# Patient Record
Sex: MALE | Race: WHITE | Employment: FULL TIME | URBAN - METROPOLITAN AREA
[De-identification: names, ages, dates, MRNs, and addresses within clinical notes are randomized per-mention and may not be internally consistent; named-entity substitution may affect disease eponyms.]

---

## 2017-02-28 ENCOUNTER — TELEPHONE (OUTPATIENT)
Dept: FAMILY MEDICINE CLINIC | Age: 42
End: 2017-02-28

## 2017-03-07 DIAGNOSIS — Z00.00 HEALTH CARE MAINTENANCE: Primary | ICD-10-CM

## 2017-03-07 DIAGNOSIS — Z00.00 HEALTH CARE MAINTENANCE: ICD-10-CM

## 2017-03-07 LAB
ALBUMIN SERPL-MCNC: 4.3 G/DL (ref 3.9–4.9)
ALP BLD-CCNC: 77 U/L (ref 35–104)
ALT SERPL-CCNC: 58 U/L (ref 0–41)
ANION GAP SERPL CALCULATED.3IONS-SCNC: 15 MEQ/L (ref 7–13)
AST SERPL-CCNC: 26 U/L (ref 0–40)
BASOPHILS ABSOLUTE: 0.1 K/UL (ref 0–0.2)
BASOPHILS RELATIVE PERCENT: 0.9 %
BILIRUB SERPL-MCNC: 0.6 MG/DL (ref 0–1.2)
BUN BLDV-MCNC: 15 MG/DL (ref 6–20)
CALCIUM SERPL-MCNC: 9.2 MG/DL (ref 8.6–10.2)
CHLORIDE BLD-SCNC: 101 MEQ/L (ref 98–107)
CHOLESTEROL, TOTAL: 162 MG/DL (ref 0–199)
CO2: 25 MEQ/L (ref 22–29)
CREAT SERPL-MCNC: 0.98 MG/DL (ref 0.7–1.2)
EOSINOPHILS ABSOLUTE: 0 K/UL (ref 0–0.7)
EOSINOPHILS RELATIVE PERCENT: 0.5 %
GFR AFRICAN AMERICAN: >60
GFR NON-AFRICAN AMERICAN: >60
GLOBULIN: 2.3 G/DL (ref 2.3–3.5)
GLUCOSE BLD-MCNC: 95 MG/DL (ref 74–109)
HCT VFR BLD CALC: 46.3 % (ref 42–52)
HDLC SERPL-MCNC: 33 MG/DL (ref 40–59)
HEMOGLOBIN: 15.9 G/DL (ref 14–18)
LDL CHOLESTEROL CALCULATED: 91 MG/DL (ref 0–129)
LYMPHOCYTES ABSOLUTE: 1.6 K/UL (ref 1–4.8)
LYMPHOCYTES RELATIVE PERCENT: 26.3 %
MCH RBC QN AUTO: 31.3 PG (ref 27–31.3)
MCHC RBC AUTO-ENTMCNC: 34.2 % (ref 33–37)
MCV RBC AUTO: 91.4 FL (ref 80–100)
MONOCYTES ABSOLUTE: 0.5 K/UL (ref 0.2–0.8)
MONOCYTES RELATIVE PERCENT: 9.3 %
NEUTROPHILS ABSOLUTE: 3.7 K/UL (ref 1.4–6.5)
NEUTROPHILS RELATIVE PERCENT: 63 %
PDW BLD-RTO: 13 % (ref 11.5–14.5)
PLATELET # BLD: 179 K/UL (ref 130–400)
POTASSIUM SERPL-SCNC: 4.5 MEQ/L (ref 3.5–5.1)
RBC # BLD: 5.07 M/UL (ref 4.7–6.1)
SODIUM BLD-SCNC: 141 MEQ/L (ref 132–144)
TOTAL PROTEIN: 6.6 G/DL (ref 6.4–8.1)
TRIGL SERPL-MCNC: 190 MG/DL (ref 0–200)
WBC # BLD: 5.9 K/UL (ref 4.8–10.8)

## 2017-03-22 ENCOUNTER — OFFICE VISIT (OUTPATIENT)
Dept: FAMILY MEDICINE CLINIC | Age: 42
End: 2017-03-22

## 2017-03-22 VITALS
RESPIRATION RATE: 12 BRPM | HEART RATE: 87 BPM | BODY MASS INDEX: 38.45 KG/M2 | HEIGHT: 67 IN | WEIGHT: 245 LBS | TEMPERATURE: 98 F | DIASTOLIC BLOOD PRESSURE: 62 MMHG | SYSTOLIC BLOOD PRESSURE: 94 MMHG

## 2017-03-22 DIAGNOSIS — J32.9 SINOBRONCHITIS: Primary | ICD-10-CM

## 2017-03-22 DIAGNOSIS — E34.9 HYPOTESTOSTERONEMIA: ICD-10-CM

## 2017-03-22 DIAGNOSIS — J40 SINOBRONCHITIS: Primary | ICD-10-CM

## 2017-03-22 PROCEDURE — 99213 OFFICE O/P EST LOW 20 MIN: CPT | Performed by: FAMILY MEDICINE

## 2017-03-22 PROCEDURE — G8417 CALC BMI ABV UP PARAM F/U: HCPCS | Performed by: FAMILY MEDICINE

## 2017-03-22 PROCEDURE — 1036F TOBACCO NON-USER: CPT | Performed by: FAMILY MEDICINE

## 2017-03-22 PROCEDURE — G8484 FLU IMMUNIZE NO ADMIN: HCPCS | Performed by: FAMILY MEDICINE

## 2017-03-22 PROCEDURE — G8427 DOCREV CUR MEDS BY ELIG CLIN: HCPCS | Performed by: FAMILY MEDICINE

## 2017-03-22 RX ORDER — AZITHROMYCIN 250 MG/1
TABLET, FILM COATED ORAL
Qty: 1 PACKET | Refills: 0 | Status: SHIPPED | OUTPATIENT
Start: 2017-03-22 | End: 2017-04-01

## 2017-03-29 ENCOUNTER — TELEPHONE (OUTPATIENT)
Dept: FAMILY MEDICINE CLINIC | Age: 42
End: 2017-03-29

## 2017-03-29 DIAGNOSIS — J40 SINOBRONCHITIS: Primary | ICD-10-CM

## 2017-03-29 DIAGNOSIS — J32.9 SINOBRONCHITIS: Primary | ICD-10-CM

## 2017-03-29 RX ORDER — LEVOFLOXACIN 500 MG/1
500 TABLET, FILM COATED ORAL DAILY
Qty: 7 TABLET | Refills: 0 | Status: SHIPPED | OUTPATIENT
Start: 2017-03-29 | End: 2017-04-05

## 2017-04-02 ASSESSMENT — ENCOUNTER SYMPTOMS
COUGH: 1
CHEST TIGHTNESS: 1
SORE THROAT: 0
RHINORRHEA: 1
ABDOMINAL PAIN: 0
SHORTNESS OF BREATH: 0
WHEEZING: 0
PHOTOPHOBIA: 0
SINUS PRESSURE: 1
VOICE CHANGE: 1
TROUBLE SWALLOWING: 0

## 2017-04-06 ENCOUNTER — OFFICE VISIT (OUTPATIENT)
Dept: FAMILY MEDICINE CLINIC | Age: 42
End: 2017-04-06

## 2017-04-06 VITALS
TEMPERATURE: 98.2 F | HEIGHT: 67 IN | WEIGHT: 247 LBS | SYSTOLIC BLOOD PRESSURE: 104 MMHG | DIASTOLIC BLOOD PRESSURE: 78 MMHG | RESPIRATION RATE: 12 BRPM | HEART RATE: 101 BPM | BODY MASS INDEX: 38.77 KG/M2

## 2017-04-06 DIAGNOSIS — J20.9 BRONCHOSPASM WITH BRONCHITIS, ACUTE: Primary | ICD-10-CM

## 2017-04-06 PROCEDURE — 99213 OFFICE O/P EST LOW 20 MIN: CPT | Performed by: FAMILY MEDICINE

## 2017-04-06 PROCEDURE — G8427 DOCREV CUR MEDS BY ELIG CLIN: HCPCS | Performed by: FAMILY MEDICINE

## 2017-04-06 PROCEDURE — 1036F TOBACCO NON-USER: CPT | Performed by: FAMILY MEDICINE

## 2017-04-06 PROCEDURE — G8417 CALC BMI ABV UP PARAM F/U: HCPCS | Performed by: FAMILY MEDICINE

## 2017-04-06 RX ORDER — ALBUTEROL SULFATE 90 UG/1
2 POWDER, METERED RESPIRATORY (INHALATION) EVERY 4 HOURS PRN
Qty: 1 INHALER | Refills: 5 | Status: SHIPPED | OUTPATIENT
Start: 2017-04-06 | End: 2018-04-03 | Stop reason: ALTCHOICE

## 2017-04-06 RX ORDER — LEVOFLOXACIN 500 MG/1
500 TABLET, FILM COATED ORAL DAILY
Qty: 7 TABLET | Refills: 0 | Status: SHIPPED | OUTPATIENT
Start: 2017-04-06 | End: 2017-04-13

## 2017-04-06 RX ORDER — PREDNISONE 20 MG/1
TABLET ORAL
Qty: 6 TABLET | Refills: 0 | Status: SHIPPED | OUTPATIENT
Start: 2017-04-06 | End: 2017-10-03 | Stop reason: ALTCHOICE

## 2017-04-16 ASSESSMENT — ENCOUNTER SYMPTOMS
CONSTIPATION: 0
WHEEZING: 0
SHORTNESS OF BREATH: 0
PHOTOPHOBIA: 0
RHINORRHEA: 1
SINUS PRESSURE: 1
BLOOD IN STOOL: 0
CHEST TIGHTNESS: 1
DIARRHEA: 0
SORE THROAT: 0
ABDOMINAL PAIN: 0
COUGH: 1

## 2017-09-22 ENCOUNTER — TELEPHONE (OUTPATIENT)
Dept: FAMILY MEDICINE CLINIC | Age: 42
End: 2017-09-22

## 2017-09-22 DIAGNOSIS — E34.9 HYPOTESTOSTERONEMIA: Primary | ICD-10-CM

## 2017-09-22 DIAGNOSIS — Z00.00 WELLNESS EXAMINATION: ICD-10-CM

## 2017-09-25 DIAGNOSIS — E34.9 HYPOTESTOSTERONEMIA: ICD-10-CM

## 2017-09-25 LAB — PROSTATE SPECIFIC ANTIGEN: 0.26 NG/ML

## 2017-10-03 ENCOUNTER — OFFICE VISIT (OUTPATIENT)
Dept: FAMILY MEDICINE CLINIC | Age: 42
End: 2017-10-03

## 2017-10-03 VITALS
HEART RATE: 91 BPM | DIASTOLIC BLOOD PRESSURE: 78 MMHG | WEIGHT: 239 LBS | RESPIRATION RATE: 12 BRPM | HEIGHT: 67 IN | TEMPERATURE: 98.1 F | SYSTOLIC BLOOD PRESSURE: 104 MMHG | BODY MASS INDEX: 37.51 KG/M2

## 2017-10-03 DIAGNOSIS — E34.9 HYPOTESTOSTERONEMIA: ICD-10-CM

## 2017-10-03 DIAGNOSIS — Z23 NEED FOR INFLUENZA VACCINATION: ICD-10-CM

## 2017-10-03 DIAGNOSIS — E34.9 HYPOTESTOSTERONEMIA: Primary | ICD-10-CM

## 2017-10-03 DIAGNOSIS — N52.1 ERECTILE DYSFUNCTION DUE TO DISEASES CLASSIFIED ELSEWHERE: ICD-10-CM

## 2017-10-03 PROCEDURE — 90688 IIV4 VACCINE SPLT 0.5 ML IM: CPT | Performed by: FAMILY MEDICINE

## 2017-10-03 PROCEDURE — G8427 DOCREV CUR MEDS BY ELIG CLIN: HCPCS | Performed by: FAMILY MEDICINE

## 2017-10-03 PROCEDURE — 99213 OFFICE O/P EST LOW 20 MIN: CPT | Performed by: FAMILY MEDICINE

## 2017-10-03 PROCEDURE — 90471 IMMUNIZATION ADMIN: CPT | Performed by: FAMILY MEDICINE

## 2017-10-03 PROCEDURE — 1036F TOBACCO NON-USER: CPT | Performed by: FAMILY MEDICINE

## 2017-10-03 PROCEDURE — G8484 FLU IMMUNIZE NO ADMIN: HCPCS | Performed by: FAMILY MEDICINE

## 2017-10-03 PROCEDURE — G8417 CALC BMI ABV UP PARAM F/U: HCPCS | Performed by: FAMILY MEDICINE

## 2017-10-03 RX ORDER — SILDENAFIL 100 MG/1
100 TABLET, FILM COATED ORAL PRN
Qty: 30 TABLET | Refills: 11 | Status: SHIPPED | OUTPATIENT
Start: 2017-10-03 | End: 2018-04-03 | Stop reason: SDUPTHER

## 2017-10-03 NOTE — MR AVS SNAPSHOT
Influenza, Shon Cid, 3 Years and older, IM 10/3/2017, 9/22/2016    Tdap (Boostrix, Adacel) 2/1/2008      Preventive Care        Date Due    HIV screening is recommended for all people regardless of risk factors  aged 15-65 years at least once (lifetime) who have never been HIV tested. 2/7/1992    Tetanus Combination Vaccine (2 - Td) 2/1/2018    Cholesterol Screening 3/7/2022            MyChart Signup           [facility] allows you to send messages to your doctor, view your test results, renew your prescriptions, schedule appointments, view visit notes, and more. How Do I Sign Up? 1. In your Internet browser, go to [email or web address]  2. Click on the Sign Up Now link in the Sign In box. You will see the New Member Sign Up page. 3. Enter your [employer] Access Code exactly as it appears below. You will not need to use this code after youve completed the sign-up process. If you do not sign up before the expiration date, you must request a new code. [employer] Access Code: [email or web address]  Expires: 12/2/2017  9:23 AM    4. Enter your Social Security Number ([id number]) and Date of Birth (mm/dd/yyyy) as indicated and click Submit. You will be taken to the next sign-up page. 5. Create a Ey[facility] ID. This will be your [facility] login ID and cannot be changed, so think of one that is secure and easy to remember. 6. Create a [employer] password. You can change your password at any time. 7. Enter your Password Reset Question and Answer. This can be used at a later time if you forget your password. 8. Enter your e-mail address. You will receive e-mail notification when new information is available in [address] 9. Click Sign Up. You can now view your medical record. Additional Information  If you have questions, please contact the physician practice where you receive care. Remember, [employer] is NOT to be used for urgent needs. For medical emergencies, dial 911. For questions regarding your [employer] account call [phone]. If you have a clinical question, please call your doctor's office.

## 2017-10-04 ENCOUNTER — TELEPHONE (OUTPATIENT)
Dept: FAMILY MEDICINE CLINIC | Age: 42
End: 2017-10-04

## 2017-10-04 LAB
SEX HORMONE BINDING GLOBULIN: 18 NMOL/L (ref 11–80)
TESTOSTERONE FREE PERCENT: 2.3 % (ref 1.6–2.9)
TESTOSTERONE FREE, CALC: 39 PG/ML (ref 47–244)
TESTOSTERONE TOTAL-MALE: 169 NG/DL (ref 300–890)

## 2017-10-04 NOTE — TELEPHONE ENCOUNTER
Jim Cope gave me RX for Viagra 100mg. # 30 one tablet as needed for ED and he did ask me to handle this. He was accepted into the PAP program until 12-31-17 but has not been getting his refills every month. (I did not complete original PAP application) I explained to patient that I will place a reorder 11-4-17 and again in 12-4-17 when his enrollment expires. His shipment will arrive at our office in the next 7-10 days. Please keep a look out for it and forward packing slip to me OR given me the medication and I will process. Whatever is easier.     Thanks

## 2017-10-09 NOTE — TELEPHONE ENCOUNTER
I spoke with patient and he is aware PAP Viagra is in and will  today before 6:00PM first floor in cupboard.

## 2017-10-12 ASSESSMENT — ENCOUNTER SYMPTOMS
BLOOD IN STOOL: 0
BACK PAIN: 0
ABDOMINAL PAIN: 0
SHORTNESS OF BREATH: 0
COUGH: 0

## 2017-10-17 ENCOUNTER — TELEPHONE (OUTPATIENT)
Dept: FAMILY MEDICINE CLINIC | Age: 42
End: 2017-10-17

## 2017-10-19 DIAGNOSIS — E34.9 HYPOTESTOSTERONEMIA: ICD-10-CM

## 2017-10-25 NOTE — TELEPHONE ENCOUNTER
PA WAS APPROVED FOR 200MG TWICE A MONTH. ;Coverage Start Date:09/25/2017;Coverage End Date:10/25/2018

## 2017-11-06 ENCOUNTER — TELEPHONE (OUTPATIENT)
Dept: FAMILY MEDICINE CLINIC | Age: 42
End: 2017-11-06

## 2017-12-08 NOTE — TELEPHONE ENCOUNTER
I spoke with patient and he is aware PAP Viagra 100mg. Has arrived at the office. He is also aware that this is the last refill on current PAP application, advised him that it might not be on PAP next year because it is now generic. I will check in mid January 2018.

## 2018-02-09 ENCOUNTER — TELEPHONE (OUTPATIENT)
Dept: FAMILY MEDICINE CLINIC | Age: 43
End: 2018-02-09

## 2018-03-12 ENCOUNTER — TELEPHONE (OUTPATIENT)
Dept: FAMILY MEDICINE CLINIC | Age: 43
End: 2018-03-12

## 2018-03-12 DIAGNOSIS — N52.01 ERECTILE DYSFUNCTION DUE TO ARTERIAL INSUFFICIENCY: Primary | ICD-10-CM

## 2018-03-13 RX ORDER — SILDENAFIL 100 MG/1
100 TABLET, FILM COATED ORAL PRN
Qty: 30 TABLET | Refills: 11 | Status: SHIPPED | OUTPATIENT
Start: 2018-03-13

## 2018-03-29 ENCOUNTER — TELEPHONE (OUTPATIENT)
Dept: FAMILY MEDICINE CLINIC | Age: 43
End: 2018-03-29

## 2018-04-03 ENCOUNTER — OFFICE VISIT (OUTPATIENT)
Dept: FAMILY MEDICINE CLINIC | Age: 43
End: 2018-04-03

## 2018-04-03 VITALS
RESPIRATION RATE: 14 BRPM | BODY MASS INDEX: 38.45 KG/M2 | HEART RATE: 89 BPM | HEIGHT: 67 IN | TEMPERATURE: 97.9 F | DIASTOLIC BLOOD PRESSURE: 74 MMHG | WEIGHT: 245 LBS | SYSTOLIC BLOOD PRESSURE: 106 MMHG

## 2018-04-03 DIAGNOSIS — Z51.81 MEDICATION MONITORING ENCOUNTER: ICD-10-CM

## 2018-04-03 DIAGNOSIS — E34.9 HYPOTESTOSTERONEMIA: ICD-10-CM

## 2018-04-03 DIAGNOSIS — E34.9 HYPOTESTOSTERONEMIA: Primary | ICD-10-CM

## 2018-04-03 LAB
ALBUMIN SERPL-MCNC: 4.6 G/DL (ref 3.9–4.9)
ALP BLD-CCNC: 67 U/L (ref 35–104)
ALT SERPL-CCNC: 57 U/L (ref 0–41)
ANION GAP SERPL CALCULATED.3IONS-SCNC: 13 MEQ/L (ref 7–13)
AST SERPL-CCNC: 30 U/L (ref 0–40)
BASOPHILS ABSOLUTE: 0 K/UL (ref 0–0.2)
BASOPHILS RELATIVE PERCENT: 0.7 %
BILIRUB SERPL-MCNC: 0.8 MG/DL (ref 0–1.2)
BUN BLDV-MCNC: 14 MG/DL (ref 6–20)
CALCIUM SERPL-MCNC: 8.9 MG/DL (ref 8.6–10.2)
CHLORIDE BLD-SCNC: 100 MEQ/L (ref 98–107)
CO2: 27 MEQ/L (ref 22–29)
CREAT SERPL-MCNC: 0.96 MG/DL (ref 0.7–1.2)
EOSINOPHILS ABSOLUTE: 0 K/UL (ref 0–0.7)
EOSINOPHILS RELATIVE PERCENT: 0.6 %
GFR AFRICAN AMERICAN: >60
GFR NON-AFRICAN AMERICAN: >60
GLOBULIN: 2.1 G/DL (ref 2.3–3.5)
GLUCOSE BLD-MCNC: 80 MG/DL (ref 74–109)
HCT VFR BLD CALC: 53.4 % (ref 42–52)
HEMOGLOBIN: 18.2 G/DL (ref 14–18)
LDL CHOLESTEROL DIRECT: 97 MG/DL (ref 0–129)
LYMPHOCYTES ABSOLUTE: 1.4 K/UL (ref 1–4.8)
LYMPHOCYTES RELATIVE PERCENT: 22.3 %
MCH RBC QN AUTO: 31.8 PG (ref 27–31.3)
MCHC RBC AUTO-ENTMCNC: 34.1 % (ref 33–37)
MCV RBC AUTO: 93.4 FL (ref 80–100)
MONOCYTES ABSOLUTE: 0.5 K/UL (ref 0.2–0.8)
MONOCYTES RELATIVE PERCENT: 7.7 %
NEUTROPHILS ABSOLUTE: 4.2 K/UL (ref 1.4–6.5)
NEUTROPHILS RELATIVE PERCENT: 68.7 %
PDW BLD-RTO: 14.3 % (ref 11.5–14.5)
PLATELET # BLD: 173 K/UL (ref 130–400)
POTASSIUM SERPL-SCNC: 4.6 MEQ/L (ref 3.5–5.1)
PROSTATE SPECIFIC ANTIGEN: 0.49 NG/ML
RBC # BLD: 5.72 M/UL (ref 4.7–6.1)
SODIUM BLD-SCNC: 140 MEQ/L (ref 132–144)
TOTAL PROTEIN: 6.7 G/DL (ref 6.4–8.1)
WBC # BLD: 6.1 K/UL (ref 4.8–10.8)

## 2018-04-03 PROCEDURE — 99213 OFFICE O/P EST LOW 20 MIN: CPT | Performed by: FAMILY MEDICINE

## 2018-04-03 ASSESSMENT — PATIENT HEALTH QUESTIONNAIRE - PHQ9
1. LITTLE INTEREST OR PLEASURE IN DOING THINGS: 0
SUM OF ALL RESPONSES TO PHQ QUESTIONS 1-9: 0
2. FEELING DOWN, DEPRESSED OR HOPELESS: 0
SUM OF ALL RESPONSES TO PHQ9 QUESTIONS 1 & 2: 0

## 2018-04-05 LAB
SEX HORMONE BINDING GLOBULIN: 22 NMOL/L (ref 11–80)
TESTOSTERONE FREE PERCENT: 2.3 % (ref 1.6–2.9)
TESTOSTERONE FREE, CALC: 127 PG/ML (ref 47–244)
TESTOSTERONE TOTAL-MALE: 550 NG/DL (ref 300–890)

## 2018-04-10 ASSESSMENT — ENCOUNTER SYMPTOMS
BLOOD IN STOOL: 0
SHORTNESS OF BREATH: 0
ABDOMINAL PAIN: 0
CHEST TIGHTNESS: 0

## 2018-05-22 ENCOUNTER — TELEPHONE (OUTPATIENT)
Dept: FAMILY MEDICINE CLINIC | Age: 43
End: 2018-05-22

## 2018-06-04 ENCOUNTER — TELEPHONE (OUTPATIENT)
Dept: FAMILY MEDICINE CLINIC | Age: 43
End: 2018-06-04

## 2018-06-22 ENCOUNTER — OFFICE VISIT (OUTPATIENT)
Dept: FAMILY MEDICINE CLINIC | Age: 43
End: 2018-06-22

## 2018-06-22 DIAGNOSIS — S39.011A STRAIN OF ABDOMINAL WALL, INITIAL ENCOUNTER: ICD-10-CM

## 2018-06-22 DIAGNOSIS — M54.16 LEFT LUMBAR RADICULOPATHY: Primary | ICD-10-CM

## 2018-06-22 PROCEDURE — 99213 OFFICE O/P EST LOW 20 MIN: CPT | Performed by: FAMILY MEDICINE

## 2018-06-22 RX ORDER — PREDNISONE 20 MG/1
TABLET ORAL
Qty: 18 TABLET | Refills: 0 | Status: SHIPPED | OUTPATIENT
Start: 2018-06-22 | End: 2018-10-03 | Stop reason: ALTCHOICE

## 2018-06-22 NOTE — PROGRESS NOTES
Testosterone Cypionate 200 MG/ML KIT Inject 1 cc every 2 weeks. Lars Davila in dosing ]. 2 kit 5    sildenafil (VIAGRA) 100 MG tablet Take 1 tablet by mouth as needed for Erectile Dysfunction 30 tablet 11     No current facility-administered medications on file prior to visit. Objective    Vitals:    06/22/18 1426   BP: 100/64   Pulse: 86   Resp: 16   Temp: 98.1 °F (36.7 °C)   TempSrc: Temporal   SpO2: 98%   Weight: 245 lb (111.1 kg)   Height: 5' 7\" (1.702 m)     Physical Exam   Constitutional: He is oriented to person, place, and time and well-developed, well-nourished, and in no distress. No distress. Cardiovascular:   Pulses:       Dorsalis pedis pulses are 2+ on the right side, and 2+ on the left side. Posterior tibial pulses are 2+ on the right side, and 2+ on the left side. Abdominal: Soft. Normal appearance. He exhibits no abdominal bruit and no mass. There is no hepatosplenomegaly. There is tenderness (diagram--no hernia felt upright/supine) in the left lower quadrant. There is no rigidity, no rebound, no guarding, no CVA tenderness, no tenderness at McBurney's point and negative Partida's sign. Hernia confirmed negative in the right inguinal area and confirmed negative in the left inguinal area. Musculoskeletal: He exhibits no edema. Left hip: He exhibits normal range of motion, normal strength, no tenderness and no bony tenderness. Left knee: He exhibits normal range of motion, no swelling, no effusion, no ecchymosis, no erythema and normal meniscus. Tenderness found. Lateral joint line (1/4 tx) tenderness noted. Lumbar back: He exhibits tenderness (minor) and pain. He exhibits no spasm. Back:         Legs:  Neurological: He is alert and oriented to person, place, and time. He has intact cranial nerves. Gait normal.   Reflex Scores:       Patellar reflexes are 2+ on the right side and 2+ on the left side.        Achilles reflexes are 2+ on the right side and 2+ on the left side. Skin: No rash noted. He is not diaphoretic. Psychiatric: Mood and affect normal.      ;  Assessment & Plan    Diagnosis Orders   1. Left lumbar radiculopathy  predniSONE (DELTASONE) 20 MG tablet   2. Strain of abdominal wall, initial encounter  predniSONE (DELTASONE) 20 MG tablet   call if abd pain worse for xrays/agrees  pred taper for ? L5   ; The patient is asked to make an attempt to improve diet and exercise patterns to aid in medical management of this problem. ;Present to ER if significant worsening of abdominal pain,vomiting,diarrhea or inability to tolerate liquids. No orders of the defined types were placed in this encounter. Orders Placed This Encounter   Medications    predniSONE (DELTASONE) 20 MG tablet     Sig: One po bid for 5 days then, One po daily for 5 days then, One half po daily for 6 days     Dispense:  18 tablet     Refill:  0     There are no discontinued medications. Return in about 2 months (around 8/22/2018) for hip. Call or return to clinic prn if these symptoms worsen or fail to improve as anticipated.       Sheron Guerra, DO

## 2018-07-02 VITALS
HEIGHT: 67 IN | HEART RATE: 86 BPM | WEIGHT: 245 LBS | RESPIRATION RATE: 16 BRPM | OXYGEN SATURATION: 98 % | DIASTOLIC BLOOD PRESSURE: 64 MMHG | SYSTOLIC BLOOD PRESSURE: 100 MMHG | BODY MASS INDEX: 38.45 KG/M2 | TEMPERATURE: 98.1 F

## 2018-07-02 ASSESSMENT — ENCOUNTER SYMPTOMS
NAUSEA: 0
DIARRHEA: 0
CHOKING: 0
BACK PAIN: 1
SHORTNESS OF BREATH: 0
CONSTIPATION: 0
ABDOMINAL PAIN: 0

## 2018-07-25 ENCOUNTER — TELEPHONE (OUTPATIENT)
Dept: FAMILY MEDICINE CLINIC | Age: 43
End: 2018-07-25

## 2018-10-01 ENCOUNTER — TELEPHONE (OUTPATIENT)
Dept: FAMILY MEDICINE CLINIC | Age: 43
End: 2018-10-01

## 2018-10-01 NOTE — TELEPHONE ENCOUNTER
PAP reorderd for Viagra 100mg. 1 x 30 - should ship to our office in the next 7-10 days. Please keep a lookout for it and forward packing slip to me OR place medication on my desk and I will process.      thank you

## 2018-10-03 ENCOUNTER — OFFICE VISIT (OUTPATIENT)
Dept: FAMILY MEDICINE CLINIC | Age: 43
End: 2018-10-03
Payer: COMMERCIAL

## 2018-10-03 VITALS
TEMPERATURE: 98.2 F | HEART RATE: 82 BPM | DIASTOLIC BLOOD PRESSURE: 80 MMHG | RESPIRATION RATE: 14 BRPM | SYSTOLIC BLOOD PRESSURE: 104 MMHG | BODY MASS INDEX: 37.35 KG/M2 | HEIGHT: 67 IN | WEIGHT: 238 LBS

## 2018-10-03 DIAGNOSIS — R06.83 HABITUAL SNORING: ICD-10-CM

## 2018-10-03 DIAGNOSIS — G47.10 HYPERSOMNOLENCE: ICD-10-CM

## 2018-10-03 DIAGNOSIS — Z23 NEED FOR PROPHYLACTIC VACCINATION AGAINST DIPHTHERIA-TETANUS-PERTUSSIS (DTP): ICD-10-CM

## 2018-10-03 DIAGNOSIS — E34.9 HYPOTESTOSTERONEMIA: Primary | ICD-10-CM

## 2018-10-03 DIAGNOSIS — Z23 NEED FOR INFLUENZA VACCINATION: ICD-10-CM

## 2018-10-03 PROCEDURE — 90472 IMMUNIZATION ADMIN EACH ADD: CPT | Performed by: FAMILY MEDICINE

## 2018-10-03 PROCEDURE — 90686 IIV4 VACC NO PRSV 0.5 ML IM: CPT | Performed by: FAMILY MEDICINE

## 2018-10-03 PROCEDURE — 90471 IMMUNIZATION ADMIN: CPT | Performed by: FAMILY MEDICINE

## 2018-10-03 PROCEDURE — 99213 OFFICE O/P EST LOW 20 MIN: CPT | Performed by: FAMILY MEDICINE

## 2018-10-03 PROCEDURE — 90715 TDAP VACCINE 7 YRS/> IM: CPT | Performed by: FAMILY MEDICINE

## 2018-10-03 NOTE — PROGRESS NOTES
Subjective  Abby Mauro, 41 y.o. male presents today with:  Chief Complaint   Patient presents with    Hip Pain     6 mnth f/u     Sleep Apnea     family hx of sleep apnea and spouse has found that patient has stopped breathing while sleeping and snore loud       HPI  Patient presents today for a six month follow up for hip pain. After pred, the left ant-lat hip pain improved; occ tightness after prolonged sitting in llq-abd area; no gi-red flags  ; No cardio-pulmonary probs;compliant with diet/rxs;no new gi-gu complaints   Spouse relates freq breath holding/snoring;pt w/ freq fighting to stay awake at work and while driving;pos fam hx/BRENNON  Vaccine Information Sheet, \"Influenza - Inactivated\" OR \"Live - Intranasal\"  given to Abby Mauro. Patient responses:    Have you ever had a reaction to a flu vaccine? No  Are you able to eat eggs without adverse effects? Yes  Do you have any current illness? No  Have you ever had Guillian Waka Syndrome? No    Flu vaccine given per order. Please see immunization tab. Review of Systems   Constitutional: Positive for fatigue. HENT: Negative for congestion, tinnitus, trouble swallowing and voice change. Eyes: Negative for visual disturbance. Respiratory: Negative for shortness of breath. Cardiovascular: Negative for chest pain, palpitations and leg swelling. Gastrointestinal: Positive for abdominal pain (trace in llq). Negative for blood in stool, constipation, diarrhea, nausea and vomiting. Genitourinary: Negative for dysuria, flank pain and frequency. Musculoskeletal: Positive for myalgias. Negative for arthralgias and back pain. Neurological: Positive for headaches. Negative for seizures, speech difficulty, weakness, light-headedness and numbness. Psychiatric/Behavioral: Positive for decreased concentration.        Allergies   Allergen Reactions    Cephalexin Nausea And Vomiting       Past Medical History:   Diagnosis Date    and bowel sounds are normal. He exhibits no abdominal bruit and no mass. There is no hepatosplenomegaly. There is tenderness (tr/4 tx -nam w/ straining) in the left lower quadrant. There is no rigidity, no rebound, no guarding, no CVA tenderness, no tenderness at McBurney's point and negative Partida's sign. Musculoskeletal: He exhibits no edema. Left hip: He exhibits normal range of motion, normal strength and no tenderness. Lymphadenopathy:        Head (right side): No submandibular adenopathy present. Head (left side): No submandibular adenopathy present. Right cervical: No posterior cervical adenopathy present. Left cervical: No posterior cervical adenopathy present. Neurological: He is alert and oriented to person, place, and time. He has intact cranial nerves. He has a normal Straight Leg Raise Test. Gait normal.   Skin: He is not diaphoretic.    Psychiatric: Mood and affect normal.     ;;  Orders Only on 04/03/2018   Component Date Value Ref Range Status    WBC 04/03/2018 6.1  4.8 - 10.8 K/uL Final    RBC 04/03/2018 5.72  4.70 - 6.10 M/uL Final    Hemoglobin 04/03/2018 18.2* 14.0 - 18.0 g/dL Final    Hematocrit 04/03/2018 53.4* 42.0 - 52.0 % Final    MCV 04/03/2018 93.4  80.0 - 100.0 fL Final    MCH 04/03/2018 31.8* 27.0 - 31.3 pg Final    MCHC 04/03/2018 34.1  33.0 - 37.0 % Final    RDW 04/03/2018 14.3  11.5 - 14.5 % Final    Platelets 73/76/7277 173  130 - 400 K/uL Final    Neutrophils % 04/03/2018 68.7  % Final    Lymphocytes % 04/03/2018 22.3  % Final    Monocytes % 04/03/2018 7.7  % Final    Eosinophils % 04/03/2018 0.6  % Final    Basophils % 04/03/2018 0.7  % Final    Neutrophils # 04/03/2018 4.2  1.4 - 6.5 K/uL Final    Lymphocytes # 04/03/2018 1.4  1.0 - 4.8 K/uL Final    Monocytes # 04/03/2018 0.5  0.2 - 0.8 K/uL Final    Eosinophils # 04/03/2018 0.0  0.0 - 0.7 K/uL Final    Basophils # 04/03/2018 0.0  0.0 - 0.2 K/uL Final    Testosterone Free, Calc 04/03/2018 127  47 - 244 pg/mL Final    Comment: INTERPRETIVE INFORMATION:  Testosterone, Free  Arthur Stage IV    35 - 169 pg/mL  Arthur Stage V     41 - 239 pg/mL  The concentration of Free Testosterone is derived from a mathematical  expression based on the constant for the binding of testosterone to Sex  Hormone Binding Globulin (SHBG). Access complete set of age- and/or gender-specific reference intervals  for  this test in the [facility] Test Directory ([email or web address]).  Testosterone % Free 04/03/2018 2.3  1.6 - 2.9 % Final    Comment: Performed by [facility] , [address] [facility] [phone]  www. Nilson Armijo MD - Lab. Director      Total Testosterone 04/03/2018 550  300 - 890 ng/dL Final    Comment: REFERENCE INTERVAL: Testosterone, Adult Male  Access complete set of age- and/or gender-specific reference intervals  for  this test in the [facility] Test Directory ([email or web address]).  Sex Hormone Binding 04/03/2018 22  11 - 80 nmol/L Final    Comment: REFERENCE INTERVAL: Sex Hormone Binding Globulin  Access complete set of age- and/or gender-specific reference intervals  for  this test in the [facility] Test Directory ([email or web address]).  Sodium 04/03/2018 140  132 - 144 mEq/L Final    Potassium 04/03/2018 4.6  3.5 - 5.1 mEq/L Final    Chloride 04/03/2018 100  98 - 107 mEq/L Final    CO2 04/03/2018 27  22 - 29 mEq/L Final    Anion Gap 04/03/2018 13  7 - 13 mEq/L Final    Glucose 04/03/2018 80  74 - 109 mg/dL Final    BUN 04/03/2018 14  6 - 20 mg/dL Final    CREATININE 04/03/2018 0.96  0.70 - 1.20 mg/dL Final    GFR Non- 04/03/2018 >60.0  >60 Final    Comment: >60 mL/min/1.73m2 EGFR, calc. for ages 25 and older using the  MDRD formula (not corrected for weight), is valid for stable  renal function.       GFR  04/03/2018 >60.0  >60 Final    Comment: >60 mL/min/1.73m2 EGFR, calc. for ages 25 and older using the  MDRD [DISCONTINUED] Testosterone Cypionate 200 MG/ML KIT Inject 1 cc every 2 weeks. Lillian Hamm in dosing ].  2 kit 5     No facility-administered encounter medications on file as of 10/3/2018.       ;Controlled Med Review:    Indication-Reviewed: low testosterone and mental/physical sxs  Reviewed that condition still requires assistance with medication  Reviewed that condition impacts on psychological and/or physical function  Reviewed that medication does indeed improve function/pain  Reviewed patient/physician roles in compliance   Reviewed acceptable alternatives [CBT/meds/exercise/PT]  Reviewed Medication Agreement  Reviewed use/abuse/diversion of meds  No significant interactions/side effects reported       Sierra Silvia, DO

## 2018-10-09 ENCOUNTER — TELEPHONE (OUTPATIENT)
Dept: FAMILY MEDICINE CLINIC | Age: 43
End: 2018-10-09

## 2018-10-09 ASSESSMENT — ENCOUNTER SYMPTOMS
TROUBLE SWALLOWING: 0
VOICE CHANGE: 0
SHORTNESS OF BREATH: 0
NAUSEA: 0
DIARRHEA: 0
ABDOMINAL PAIN: 1
VOMITING: 0
CONSTIPATION: 0
BACK PAIN: 0
BLOOD IN STOOL: 0

## 2018-10-22 ENCOUNTER — HOSPITAL ENCOUNTER (OUTPATIENT)
Dept: SLEEP CENTER | Age: 43
Discharge: HOME | End: 2018-10-24
Payer: COMMERCIAL

## 2018-10-22 PROCEDURE — G0399 HOME SLEEP TEST/TYPE 3 PORTA: HCPCS

## 2018-10-30 DIAGNOSIS — G47.10 HYPERSOMNOLENCE: ICD-10-CM

## 2018-10-30 DIAGNOSIS — R06.83 HABITUAL SNORING: ICD-10-CM

## 2018-11-01 DIAGNOSIS — G47.33 OSA (OBSTRUCTIVE SLEEP APNEA): Primary | ICD-10-CM

## 2018-11-03 DIAGNOSIS — E34.9 HYPOTESTOSTERONEMIA: ICD-10-CM

## 2018-11-03 LAB
HCT VFR BLD CALC: 53 % (ref 42–52)
HEMOGLOBIN: 18.3 G/DL (ref 14–18)
MCH RBC QN AUTO: 32.4 PG (ref 27–31.3)
MCHC RBC AUTO-ENTMCNC: 34.5 % (ref 33–37)
MCV RBC AUTO: 93.8 FL (ref 80–100)
PDW BLD-RTO: 13.3 % (ref 11.5–14.5)
PLATELET # BLD: 183 K/UL (ref 130–400)
PROSTATE SPECIFIC ANTIGEN: 0.39 NG/ML
RBC # BLD: 5.65 M/UL (ref 4.7–6.1)
WBC # BLD: 6.4 K/UL (ref 4.8–10.8)

## 2018-11-06 LAB — TESTOSTERONE TOTAL-MALE: 1099 NG/DL (ref 300–890)

## 2018-11-16 ENCOUNTER — HOSPITAL ENCOUNTER (OUTPATIENT)
Dept: SLEEP CENTER | Age: 43
Discharge: HOME | End: 2018-11-18
Payer: COMMERCIAL

## 2018-11-16 PROCEDURE — 95811 POLYSOM 6/>YRS CPAP 4/> PARM: CPT

## 2018-11-16 PROCEDURE — 95811 POLYSOM 6/>YRS CPAP 4/> PARM: CPT | Performed by: INTERNAL MEDICINE

## 2018-11-19 DIAGNOSIS — G47.33 OSA (OBSTRUCTIVE SLEEP APNEA): ICD-10-CM

## 2018-11-28 DIAGNOSIS — G47.33 OSA (OBSTRUCTIVE SLEEP APNEA): Primary | ICD-10-CM

## 2018-11-29 ENCOUNTER — OFFICE VISIT (OUTPATIENT)
Dept: PULMONOLOGY | Age: 43
End: 2018-11-29
Payer: COMMERCIAL

## 2018-11-29 VITALS
TEMPERATURE: 97.5 F | DIASTOLIC BLOOD PRESSURE: 88 MMHG | SYSTOLIC BLOOD PRESSURE: 118 MMHG | BODY MASS INDEX: 36.79 KG/M2 | HEART RATE: 76 BPM | OXYGEN SATURATION: 99 % | HEIGHT: 67 IN | WEIGHT: 234.4 LBS

## 2018-11-29 DIAGNOSIS — G47.33 OBSTRUCTIVE SLEEP APNEA: Primary | ICD-10-CM

## 2018-11-29 PROCEDURE — 1036F TOBACCO NON-USER: CPT | Performed by: INTERNAL MEDICINE

## 2018-11-29 PROCEDURE — 99203 OFFICE O/P NEW LOW 30 MIN: CPT | Performed by: INTERNAL MEDICINE

## 2018-11-29 PROCEDURE — G8482 FLU IMMUNIZE ORDER/ADMIN: HCPCS | Performed by: INTERNAL MEDICINE

## 2018-11-29 PROCEDURE — G8427 DOCREV CUR MEDS BY ELIG CLIN: HCPCS | Performed by: INTERNAL MEDICINE

## 2018-11-29 PROCEDURE — G8417 CALC BMI ABV UP PARAM F/U: HCPCS | Performed by: INTERNAL MEDICINE

## 2018-11-29 ASSESSMENT — ENCOUNTER SYMPTOMS
SINUS PRESSURE: 0
ABDOMINAL PAIN: 0
NAUSEA: 0
DIARRHEA: 0
SORE THROAT: 0
SHORTNESS OF BREATH: 0
CHEST TIGHTNESS: 0
RHINORRHEA: 0
WHEEZING: 0
VOMITING: 0
COUGH: 0

## 2018-11-29 NOTE — PROGRESS NOTES
encounter. Orders Placed This Encounter   Medications    CPAP Machine MISC     Sig: by Does not apply route Start CPAP at 9 cm of water     Dispense:  1 each     Refill:  0           Return in about 3 months (around 2/28/2019) for re-evaluation.       Lashae Figueroa MD

## 2018-12-07 ENCOUNTER — TELEPHONE (OUTPATIENT)
Dept: FAMILY MEDICINE CLINIC | Age: 43
End: 2018-12-07

## 2019-02-04 ENCOUNTER — OFFICE VISIT (OUTPATIENT)
Dept: FAMILY MEDICINE CLINIC | Age: 44
End: 2019-02-04
Payer: COMMERCIAL

## 2019-02-04 VITALS
HEIGHT: 67 IN | BODY MASS INDEX: 35.79 KG/M2 | WEIGHT: 228 LBS | SYSTOLIC BLOOD PRESSURE: 102 MMHG | TEMPERATURE: 97.4 F | DIASTOLIC BLOOD PRESSURE: 68 MMHG | RESPIRATION RATE: 16 BRPM | HEART RATE: 76 BPM

## 2019-02-04 DIAGNOSIS — E34.9 HYPOTESTOSTERONEMIA: ICD-10-CM

## 2019-02-04 PROCEDURE — G8417 CALC BMI ABV UP PARAM F/U: HCPCS | Performed by: FAMILY MEDICINE

## 2019-02-04 PROCEDURE — G8427 DOCREV CUR MEDS BY ELIG CLIN: HCPCS | Performed by: FAMILY MEDICINE

## 2019-02-04 PROCEDURE — 1036F TOBACCO NON-USER: CPT | Performed by: FAMILY MEDICINE

## 2019-02-04 PROCEDURE — 99213 OFFICE O/P EST LOW 20 MIN: CPT | Performed by: FAMILY MEDICINE

## 2019-02-04 PROCEDURE — G8482 FLU IMMUNIZE ORDER/ADMIN: HCPCS | Performed by: FAMILY MEDICINE

## 2019-02-11 ASSESSMENT — ENCOUNTER SYMPTOMS
ABDOMINAL PAIN: 0
NAUSEA: 0
SHORTNESS OF BREATH: 0
BLOOD IN STOOL: 0
COUGH: 0

## 2019-02-23 ENCOUNTER — TELEPHONE (OUTPATIENT)
Dept: FAMILY MEDICINE CLINIC | Age: 44
End: 2019-02-23

## 2019-02-25 ENCOUNTER — TELEPHONE (OUTPATIENT)
Dept: ADMINISTRATIVE | Age: 44
End: 2019-02-25

## 2019-02-26 ENCOUNTER — TELEPHONE (OUTPATIENT)
Dept: FAMILY MEDICINE CLINIC | Age: 44
End: 2019-02-26

## 2019-05-06 ENCOUNTER — OFFICE VISIT (OUTPATIENT)
Dept: FAMILY MEDICINE CLINIC | Age: 44
End: 2019-05-06
Payer: COMMERCIAL

## 2019-05-06 VITALS
TEMPERATURE: 97.4 F | HEART RATE: 85 BPM | SYSTOLIC BLOOD PRESSURE: 122 MMHG | RESPIRATION RATE: 16 BRPM | OXYGEN SATURATION: 98 % | DIASTOLIC BLOOD PRESSURE: 64 MMHG | WEIGHT: 232 LBS | BODY MASS INDEX: 37.28 KG/M2 | HEIGHT: 66 IN

## 2019-05-06 DIAGNOSIS — E34.9 HYPOTESTOSTERONEMIA: ICD-10-CM

## 2019-05-06 PROCEDURE — 1036F TOBACCO NON-USER: CPT | Performed by: INTERNAL MEDICINE

## 2019-05-06 PROCEDURE — G8427 DOCREV CUR MEDS BY ELIG CLIN: HCPCS | Performed by: INTERNAL MEDICINE

## 2019-05-06 PROCEDURE — G8417 CALC BMI ABV UP PARAM F/U: HCPCS | Performed by: INTERNAL MEDICINE

## 2019-05-06 PROCEDURE — 99213 OFFICE O/P EST LOW 20 MIN: CPT | Performed by: INTERNAL MEDICINE

## 2019-05-06 ASSESSMENT — PATIENT HEALTH QUESTIONNAIRE - PHQ9
SUM OF ALL RESPONSES TO PHQ9 QUESTIONS 1 & 2: 0
SUM OF ALL RESPONSES TO PHQ QUESTIONS 1-9: 0
2. FEELING DOWN, DEPRESSED OR HOPELESS: 0
SUM OF ALL RESPONSES TO PHQ QUESTIONS 1-9: 0
1. LITTLE INTEREST OR PLEASURE IN DOING THINGS: 0

## 2019-05-06 NOTE — PROGRESS NOTES
Renzo Natarajan 45 y.o. male presents today with   Chief Complaint   Patient presents with   [facility] Establish Care     Pt. is here today to establish care, previous pcp was Dr. Deon Helton. He states he is here today for a 3 month follow up to check on Hypotestosteronemia. HPI  Check on testosterone. He feels go on it. Past Medical History:   Diagnosis Date    Hypotestosteronemia      Patient Active Problem List    Diagnosis Date Noted    BRENNON (obstructive sleep apnea)     Hypotestosteronemia      No past surgical history on file. No family history on file. Social History     Socioeconomic History    Marital status:      Spouse name: None    Number of children: None    Years of education: None    Highest education level: None   Occupational History    None   Social Needs    Financial resource strain: None    Food insecurity:     Worry: None     Inability: None    Transportation needs:     Medical: None     Non-medical: None   Tobacco Use    Smoking status: Never Smoker    Smokeless tobacco: Never Used   Substance and Sexual Activity    Alcohol use: Yes     Comment: very little    Drug use: No    Sexual activity: None   Lifestyle    Physical activity:     Days per week: None     Minutes per session: None    Stress: None   Relationships    Social connections:     Talks on phone: None     Gets together: None     Attends [employer] service: None     Active member of club or organization: None     Attends meetings of clubs or organizations: None     Relationship status: None    Intimate partner violence:     Fear of current or ex partner: None     Emotionally abused: None     Physically abused: None     Forced sexual activity: None   Other Topics Concern    None   Social History Narrative    None     Allergies   Allergen Reactions    Cephalexin Nausea And Vomiting       Review of Systems   Constitutional: Negative for fatigue and fever.    HENT: Negative for trouble swallowing and voice change. Eyes: Negative for photophobia and visual disturbance. Respiratory: Negative for choking and shortness of breath. Cardiovascular: Negative for chest pain and palpitations. Gastrointestinal: Negative for nausea and vomiting. Genitourinary: Negative for decreased urine volume, testicular pain and urgency. Skin: Negative for rash. Neurological: Negative for tremors and syncope. Hematological: Does not bruise/bleed easily. Psychiatric/Behavioral: Negative for suicidal ideas. Vitals:    05/06/19 1403   BP: 122/64   Pulse: 85   Resp: 16   Temp: 97.4 °F (36.3 °C)   TempSrc: Temporal   SpO2: 98%   Weight: 232 lb (105.2 kg)   Height: 5' 6\" (1.676 m)       Physical Exam   Constitutional: He appears well-developed and well-nourished. HENT:   Head: Normocephalic and atraumatic. Eyes: Pupils are equal, round, and reactive to light. Conjunctivae and EOM are normal.   Neck: Normal range of motion. No thyromegaly present. Cardiovascular: Normal rate and regular rhythm. Pulmonary/Chest: Effort normal. No respiratory distress. He has no wheezes. Abdominal: Soft. He exhibits no distension. Musculoskeletal: Normal range of motion. Neurological: He is alert. Skin: Skin is dry. Psychiatric: He has a normal mood and affect. I discuss the risk of testosterone  Assessment/Plan  Kelly Oden was seen today for establish care. Diagnoses and all orders for this visit:    Hypotestosteronemia  -     Testosterone Cypionate 200 MG/ML KIT; Inject 1 cc every 3 weeeks  -     Testosterone Free And Total Male; Future        No follow-ups on file.     Aubree Reeves MD

## 2019-05-08 ENCOUNTER — OFFICE VISIT (OUTPATIENT)
Dept: PULMONOLOGY | Age: 44
End: 2019-05-08
Payer: COMMERCIAL

## 2019-05-08 VITALS
HEART RATE: 81 BPM | TEMPERATURE: 98 F | HEIGHT: 67 IN | RESPIRATION RATE: 16 BRPM | OXYGEN SATURATION: 98 % | SYSTOLIC BLOOD PRESSURE: 118 MMHG | BODY MASS INDEX: 36.7 KG/M2 | DIASTOLIC BLOOD PRESSURE: 68 MMHG | WEIGHT: 233.8 LBS

## 2019-05-08 DIAGNOSIS — G47.33 OSA ON CPAP: Primary | ICD-10-CM

## 2019-05-08 DIAGNOSIS — E34.9 HYPOTESTOSTERONEMIA: ICD-10-CM

## 2019-05-08 DIAGNOSIS — Z99.89 OSA ON CPAP: Primary | ICD-10-CM

## 2019-05-08 PROCEDURE — G8427 DOCREV CUR MEDS BY ELIG CLIN: HCPCS | Performed by: INTERNAL MEDICINE

## 2019-05-08 PROCEDURE — 1036F TOBACCO NON-USER: CPT | Performed by: INTERNAL MEDICINE

## 2019-05-08 PROCEDURE — 99213 OFFICE O/P EST LOW 20 MIN: CPT | Performed by: INTERNAL MEDICINE

## 2019-05-08 PROCEDURE — G8417 CALC BMI ABV UP PARAM F/U: HCPCS | Performed by: INTERNAL MEDICINE

## 2019-05-08 ASSESSMENT — ENCOUNTER SYMPTOMS
CHEST TIGHTNESS: 0
DIARRHEA: 0
NAUSEA: 0
VOMITING: 0
COUGH: 0
RHINORRHEA: 0
SINUS PRESSURE: 0
SHORTNESS OF BREATH: 0
ABDOMINAL PAIN: 0
SORE THROAT: 0
WHEEZING: 0

## 2019-05-08 NOTE — PROGRESS NOTES
Subjective: Иван Moss is a 45 y.o. male who complains today of:     Chief Complaint   Patient presents with    Follow-up     3 Month F/U for BRENNON on CPAP       HPI  Patient is here for a follow-up visit regarding obstructive sleep apnea. Since his initial visit patient was started on CPAP therapy at 9 cm of water. He has done very well with treatment using it every night, does not sleep without it, averaging 6 hours or more of sleep. His AHI has remained well below 5 per hour. He sleeps through the night without waking up much at all. Feels rested in the morning but still having low energy level and \"sleep attacks\" occasionally. He also is having difficulty controlling his testosterone level is replacement injection was decreased by his primary physician recently at the same time he started CPAP and that seemed to worsen his daytime energy level. This is being increased gradually again    Allergies:  Cephalexin  Past Medical History:   Diagnosis Date    Hypotestosteronemia      History reviewed. No pertinent surgical history. History reviewed. No pertinent family history.   Social History     Socioeconomic History    Marital status:      Spouse name: Not on file    Number of children: Not on file    Years of education: Not on file    Highest education level: Not on file   Occupational History    Not on file   Social Needs    Financial resource strain: Not on file    Food insecurity:     Worry: Not on file     Inability: Not on file    Transportation needs:     Medical: Not on file     Non-medical: Not on file   Tobacco Use    Smoking status: Never Smoker    Smokeless tobacco: Never Used   Substance and Sexual Activity    Alcohol use: Yes     Comment: very little    Drug use: No    Sexual activity: Not on file   Lifestyle    Physical activity:     Days per week: Not on file     Minutes per session: Not on file    Stress: Not on file   Relationships    Social connections:

## 2019-05-09 ASSESSMENT — ENCOUNTER SYMPTOMS
VOICE CHANGE: 0
TROUBLE SWALLOWING: 0
CHOKING: 0
NAUSEA: 0
VOMITING: 0
SHORTNESS OF BREATH: 0
PHOTOPHOBIA: 0

## 2019-08-21 ENCOUNTER — TELEPHONE (OUTPATIENT)
Dept: FAMILY MEDICINE CLINIC | Age: 44
End: 2019-08-21

## 2019-08-21 NOTE — TELEPHONE ENCOUNTER
Received a fax from [employer] [facility]-need current financial documentation. The pay stubs that I have are from 12-30-18 and 1-27-19. I left a message for patient to contact Emily at [phone] regarding PAP     Patient did call me back and he will scan pay stubs to me. Once received I will process. Spoke with Celia at [employer] [facility] for Viagra 100mg approved 8-22-19 and [facility] to [facility] office in the next 7-10 days. Please keep a look out for it and forward packing slip to me in [facility]. I spoke with patient and [facility].

## 2019-10-23 DIAGNOSIS — E34.9 HYPOTESTOSTERONEMIA: ICD-10-CM

## 2019-10-25 LAB
SEX HORMONE BINDING GLOBULIN: 19 NMOL/L (ref 11–80)
TESTOSTERONE FREE PERCENT: 2.2 % (ref 1.6–2.9)
TESTOSTERONE FREE, CALC: 39 PG/ML (ref 47–244)
TESTOSTERONE TOTAL-MALE: 176 NG/DL (ref 300–890)

## 2019-10-29 ENCOUNTER — TELEPHONE (OUTPATIENT)
Dept: FAMILY MEDICINE CLINIC | Age: 44
End: 2019-10-29

## 2019-11-11 ENCOUNTER — OFFICE VISIT (OUTPATIENT)
Dept: PRIMARY CARE CLINIC | Age: 44
End: 2019-11-11
Payer: COMMERCIAL

## 2019-11-11 VITALS
RESPIRATION RATE: 14 BRPM | BODY MASS INDEX: 37.2 KG/M2 | TEMPERATURE: 97.5 F | HEART RATE: 80 BPM | SYSTOLIC BLOOD PRESSURE: 114 MMHG | WEIGHT: 237 LBS | OXYGEN SATURATION: 97 % | DIASTOLIC BLOOD PRESSURE: 63 MMHG | HEIGHT: 67 IN

## 2019-11-11 DIAGNOSIS — E34.9 HYPOTESTOSTERONEMIA: Primary | ICD-10-CM

## 2019-11-11 DIAGNOSIS — Z12.5 PROSTATE CANCER SCREENING: ICD-10-CM

## 2019-11-11 DIAGNOSIS — Z00.00 PREVENTATIVE HEALTH CARE: ICD-10-CM

## 2019-11-11 PROCEDURE — 99213 OFFICE O/P EST LOW 20 MIN: CPT | Performed by: INTERNAL MEDICINE

## 2019-11-11 PROCEDURE — G8417 CALC BMI ABV UP PARAM F/U: HCPCS | Performed by: INTERNAL MEDICINE

## 2019-11-11 PROCEDURE — 1036F TOBACCO NON-USER: CPT | Performed by: INTERNAL MEDICINE

## 2019-11-11 PROCEDURE — G8484 FLU IMMUNIZE NO ADMIN: HCPCS | Performed by: INTERNAL MEDICINE

## 2019-11-11 PROCEDURE — G8427 DOCREV CUR MEDS BY ELIG CLIN: HCPCS | Performed by: INTERNAL MEDICINE

## 2019-11-14 ENCOUNTER — TELEPHONE (OUTPATIENT)
Dept: FAMILY MEDICINE CLINIC | Age: 44
End: 2019-11-14

## 2019-11-17 ASSESSMENT — ENCOUNTER SYMPTOMS
VOMITING: 0
CHOKING: 0
SHORTNESS OF BREATH: 0
TROUBLE SWALLOWING: 0
PHOTOPHOBIA: 0
VOICE CHANGE: 0
NAUSEA: 0

## 2019-11-20 ENCOUNTER — TELEPHONE (OUTPATIENT)
Dept: PRIMARY CARE CLINIC | Age: 44
End: 2019-11-20

## 2019-11-23 ENCOUNTER — TELEPHONE (OUTPATIENT)
Dept: PRIMARY CARE CLINIC | Age: 44
End: 2019-11-23

## 2020-06-16 ENCOUNTER — TELEPHONE (OUTPATIENT)
Dept: PRIMARY CARE CLINIC | Age: 45
End: 2020-06-16

## 2020-06-16 RX ORDER — TESTOSTERONE CYPIONATE 200 MG/ML
INJECTION INTRAMUSCULAR
Qty: 2 ML | Refills: 0 | Status: SHIPPED | OUTPATIENT
Start: 2020-06-16 | End: 2020-08-02

## 2020-06-17 LAB
BASOPHILS # BLD: 0.04 X10E9/L (ref 0–0.1)
BASOPHILS RELATIVE PERCENT: 0.6 % (ref 0–2)
CHOLESTEROL/HDL RATIO: 4.8
CHOLESTEROL: 186 MG/DL (ref 0–199)
EOSINOPHIL # BLD: 0.04 X10E9/L (ref 0–0.7)
EOSINOPHILS RELATIVE PERCENT: 0.6 % (ref 0–6)
ERYTHROCYTE [DISTWIDTH] IN BLOOD BY AUTOMATED COUNT: 13.3 % (ref 11.5–14)
HCT VFR BLD CALC: 52.6 % (ref 41–52)
HDLC SERPL-MCNC: 38.5 MG/DL
HEMOGLOBIN: 17.4 G/DL (ref 13.5–17)
IMMATURE GRANULOCYTES %: 0.3 % (ref 0–0.9)
LDL CHOLESTEROL: 103 MG/DL (ref 0–99)
LYMPHOCYTES # BLD: 27.9 % (ref 13–44)
LYMPHOCYTES RELATIVE PERCENT: 1.73 X10E9/L (ref 1.2–4.8)
MCHC RBC AUTO-ENTMCNC: 33.1 G/DL (ref 32–36)
MCV RBC AUTO: 94 FL (ref 80–100)
MONOCYTES # BLD: 0.63 X10E9/L (ref 0.1–1)
MONOCYTES RELATIVE PERCENT: 10.2 % (ref 2–10)
NEUTROPHILS RELATIVE PERCENT: 60.4 % (ref 40–80)
NEUTROPHILS: 3.74 X10E9/L (ref 1.2–7.7)
NON-HDL CHOLESTEROL: 148 MG/DL
NUCLEATED RBCS: 0 /100 WBC (ref 0–0)
PLATELET # BLD: 175 X10E9/L (ref 150–450)
PROSTATE SPECIFIC ANTIGEN: 0.22 NG/ML (ref 0–4)
RBC # BLD: 5.62 X10E12/L (ref 4.5–5.9)
TRIGL SERPL-MCNC: 225 MG/DL (ref 0–149)
VLDLC SERPL CALC-MCNC: 45 MG/DL (ref 0–40)
WBC: 6.2 X10E9/L (ref 4.4–11.3)

## 2020-06-22 LAB
TESTOSTERONE FREE: 33.5 PG/ML (ref 35–155)
TESTOSTERONE TOTAL-MALE: 182 NG/DL (ref 250–1100)

## 2020-06-29 ENCOUNTER — OFFICE VISIT (OUTPATIENT)
Dept: PRIMARY CARE CLINIC | Age: 45
End: 2020-06-29
Payer: COMMERCIAL

## 2020-06-29 VITALS
HEIGHT: 67 IN | WEIGHT: 234 LBS | SYSTOLIC BLOOD PRESSURE: 118 MMHG | TEMPERATURE: 98.9 F | OXYGEN SATURATION: 95 % | HEART RATE: 93 BPM | RESPIRATION RATE: 14 BRPM | BODY MASS INDEX: 36.73 KG/M2 | DIASTOLIC BLOOD PRESSURE: 80 MMHG

## 2020-06-29 PROCEDURE — 1036F TOBACCO NON-USER: CPT | Performed by: INTERNAL MEDICINE

## 2020-06-29 PROCEDURE — G8417 CALC BMI ABV UP PARAM F/U: HCPCS | Performed by: INTERNAL MEDICINE

## 2020-06-29 PROCEDURE — 99213 OFFICE O/P EST LOW 20 MIN: CPT | Performed by: INTERNAL MEDICINE

## 2020-06-29 PROCEDURE — G8427 DOCREV CUR MEDS BY ELIG CLIN: HCPCS | Performed by: INTERNAL MEDICINE

## 2020-06-29 RX ORDER — TESTOSTERONE 12.5 MG/1.25G
5 GEL TOPICAL DAILY
Qty: 30 PACKAGE | Refills: 5 | Status: SHIPPED | OUTPATIENT
Start: 2020-06-29 | End: 2021-10-06 | Stop reason: ALTCHOICE

## 2020-06-29 RX ORDER — TESTOSTERONE GEL, 1% 10 MG/G
50 GEL TRANSDERMAL DAILY
Qty: 30 PACKAGE | Refills: 5 | Status: SHIPPED | OUTPATIENT
Start: 2020-06-29 | End: 2021-10-06 | Stop reason: ALTCHOICE

## 2020-06-29 SDOH — ECONOMIC STABILITY: INCOME INSECURITY: HOW HARD IS IT FOR YOU TO PAY FOR THE VERY BASICS LIKE FOOD, HOUSING, MEDICAL CARE, AND HEATING?: NOT HARD AT ALL

## 2020-06-29 SDOH — ECONOMIC STABILITY: FOOD INSECURITY: WITHIN THE PAST 12 MONTHS, YOU WORRIED THAT YOUR FOOD WOULD RUN OUT BEFORE YOU GOT MONEY TO BUY MORE.: NEVER TRUE

## 2020-06-29 SDOH — ECONOMIC STABILITY: FOOD INSECURITY: WITHIN THE PAST 12 MONTHS, THE FOOD YOU BOUGHT JUST DIDN'T LAST AND YOU DIDN'T HAVE MONEY TO GET MORE.: NEVER TRUE

## 2020-06-29 ASSESSMENT — PATIENT HEALTH QUESTIONNAIRE - PHQ9
1. LITTLE INTEREST OR PLEASURE IN DOING THINGS: 0
SUM OF ALL RESPONSES TO PHQ9 QUESTIONS 1 & 2: 0
SUM OF ALL RESPONSES TO PHQ QUESTIONS 1-9: 0
SUM OF ALL RESPONSES TO PHQ QUESTIONS 1-9: 0
2. FEELING DOWN, DEPRESSED OR HOPELESS: 0

## 2020-06-30 ASSESSMENT — ENCOUNTER SYMPTOMS
PHOTOPHOBIA: 0
TROUBLE SWALLOWING: 0
SHORTNESS OF BREATH: 0
VOMITING: 0
NAUSEA: 0
CHOKING: 0
VOICE CHANGE: 0

## 2020-07-17 ENCOUNTER — TELEPHONE (OUTPATIENT)
Dept: PRIMARY CARE CLINIC | Age: 45
End: 2020-07-17

## 2020-07-24 ENCOUNTER — OFFICE VISIT (OUTPATIENT)
Dept: ENDOCRINOLOGY | Age: 45
End: 2020-07-24
Payer: COMMERCIAL

## 2020-07-24 VITALS
HEART RATE: 88 BPM | BODY MASS INDEX: 36.49 KG/M2 | SYSTOLIC BLOOD PRESSURE: 121 MMHG | DIASTOLIC BLOOD PRESSURE: 75 MMHG | OXYGEN SATURATION: 97 % | WEIGHT: 233 LBS

## 2020-07-24 PROCEDURE — G8417 CALC BMI ABV UP PARAM F/U: HCPCS | Performed by: INTERNAL MEDICINE

## 2020-07-24 PROCEDURE — 1036F TOBACCO NON-USER: CPT | Performed by: INTERNAL MEDICINE

## 2020-07-24 PROCEDURE — G8428 CUR MEDS NOT DOCUMENT: HCPCS | Performed by: INTERNAL MEDICINE

## 2020-07-24 PROCEDURE — 99203 OFFICE O/P NEW LOW 30 MIN: CPT | Performed by: INTERNAL MEDICINE

## 2020-07-24 RX ORDER — TESTOSTERONE 16.2 MG/G
GEL TRANSDERMAL
Qty: 1 BOTTLE | Refills: 3 | Status: SHIPPED | OUTPATIENT
Start: 2020-07-24 | End: 2021-10-06 | Stop reason: ALTCHOICE

## 2020-08-02 RX ORDER — TESTOSTERONE CYPIONATE 200 MG/ML
INJECTION INTRAMUSCULAR
Qty: 2 ML | Refills: 0 | Status: SHIPPED | OUTPATIENT
Start: 2020-08-02 | End: 2020-09-01

## 2020-08-02 ASSESSMENT — ENCOUNTER SYMPTOMS: SWOLLEN GLANDS: 0

## 2020-08-02 NOTE — TELEPHONE ENCOUNTER
Pharmacy is requesting medication refill. Please approve or deny this request.    Rx requested:  Requested Prescriptions     Pending Prescriptions Disp Refills    testosterone cypionate (DEPOTESTOTERONE CYPIONATE) 200 MG/ML injection [Pharmacy Med Name: TESTOSTERONE  MG/ML] 2 mL 0     Sig: INJECT 1 ML INTRAMUSCULARLY EVERY 2 WEEKS         Last Office Visit:   6/29/2020      Next Visit Date:  No future appointments.

## 2020-08-20 ENCOUNTER — TELEPHONE (OUTPATIENT)
Dept: ENDOCRINOLOGY | Age: 45
End: 2020-08-20

## 2020-09-01 RX ORDER — TESTOSTERONE CYPIONATE 200 MG/ML
INJECTION INTRAMUSCULAR
Qty: 2 ML | Refills: 0 | Status: SHIPPED | OUTPATIENT
Start: 2020-09-01 | End: 2020-10-05

## 2020-10-05 RX ORDER — TESTOSTERONE CYPIONATE 200 MG/ML
INJECTION INTRAMUSCULAR
Qty: 2 ML | Refills: 0 | Status: SHIPPED | OUTPATIENT
Start: 2020-10-05 | End: 2020-11-04

## 2020-10-05 NOTE — TELEPHONE ENCOUNTER
Pharmacy requesting medication refill. Please approve or deny this request.    Rx requested:  Requested Prescriptions     Pending Prescriptions Disp Refills    testosterone cypionate (DEPOTESTOTERONE CYPIONATE) 200 MG/ML injection [Pharmacy Med Name: TESTOSTERONE  MG/ML] 2 mL 0     Sig: INJECT 1 ML INTRAMUSCULARLY EVERY 2 WEEKS         Last Office Visit:   6/29/2020      Next Visit Date:  No future appointments.

## 2020-11-04 RX ORDER — TESTOSTERONE CYPIONATE 200 MG/ML
INJECTION INTRAMUSCULAR
Qty: 2 ML | Refills: 0 | Status: SHIPPED | OUTPATIENT
Start: 2020-11-04 | End: 2020-12-07

## 2020-12-07 RX ORDER — TESTOSTERONE CYPIONATE 200 MG/ML
INJECTION INTRAMUSCULAR
Qty: 2 ML | Refills: 0 | Status: SHIPPED | OUTPATIENT
Start: 2020-12-07 | End: 2021-01-18

## 2021-01-18 DIAGNOSIS — E34.9 HYPOTESTOSTERONEMIA: ICD-10-CM

## 2021-01-18 RX ORDER — TESTOSTERONE CYPIONATE 200 MG/ML
INJECTION INTRAMUSCULAR
Qty: 2 ML | Refills: 0 | Status: SHIPPED | OUTPATIENT
Start: 2021-01-18 | End: 2021-03-24 | Stop reason: SDUPTHER

## 2021-01-18 NOTE — TELEPHONE ENCOUNTER
Rx request   Requested Prescriptions     Pending Prescriptions Disp Refills    testosterone cypionate (DEPOTESTOTERONE CYPIONATE) 200 MG/ML injection [Pharmacy Med Name: TESTOSTERONE  MG/ML] 2 mL 0     Sig: INJECT 1 ML INTRAMUSCULARLY EVERY 2 WEEKS     LOV 6/29/2020  Next Visit Date:  No future appointments.

## 2021-03-24 ENCOUNTER — OFFICE VISIT (OUTPATIENT)
Dept: FAMILY MEDICINE CLINIC | Age: 46
End: 2021-03-24
Payer: COMMERCIAL

## 2021-03-24 VITALS
BODY MASS INDEX: 36.18 KG/M2 | OXYGEN SATURATION: 96 % | HEART RATE: 92 BPM | DIASTOLIC BLOOD PRESSURE: 80 MMHG | WEIGHT: 231 LBS | TEMPERATURE: 96.7 F | SYSTOLIC BLOOD PRESSURE: 118 MMHG | RESPIRATION RATE: 17 BRPM

## 2021-03-24 DIAGNOSIS — R20.0 ARM NUMBNESS: ICD-10-CM

## 2021-03-24 DIAGNOSIS — E34.9 HYPOTESTOSTERONEMIA: ICD-10-CM

## 2021-03-24 DIAGNOSIS — Z11.4 ENCOUNTER FOR SCREENING FOR HIV: ICD-10-CM

## 2021-03-24 DIAGNOSIS — G56.03 CARPAL TUNNEL SYNDROME ON BOTH SIDES: Primary | ICD-10-CM

## 2021-03-24 DIAGNOSIS — Z11.59 ENCOUNTER FOR HEPATITIS C SCREENING TEST FOR LOW RISK PATIENT: ICD-10-CM

## 2021-03-24 PROCEDURE — G8417 CALC BMI ABV UP PARAM F/U: HCPCS | Performed by: INTERNAL MEDICINE

## 2021-03-24 PROCEDURE — 99213 OFFICE O/P EST LOW 20 MIN: CPT | Performed by: INTERNAL MEDICINE

## 2021-03-24 PROCEDURE — 1036F TOBACCO NON-USER: CPT | Performed by: INTERNAL MEDICINE

## 2021-03-24 PROCEDURE — G8427 DOCREV CUR MEDS BY ELIG CLIN: HCPCS | Performed by: INTERNAL MEDICINE

## 2021-03-24 PROCEDURE — G8484 FLU IMMUNIZE NO ADMIN: HCPCS | Performed by: INTERNAL MEDICINE

## 2021-03-24 RX ORDER — TESTOSTERONE CYPIONATE 200 MG/ML
INJECTION INTRAMUSCULAR
Qty: 2 ML | Refills: 5 | Status: SHIPPED | OUTPATIENT
Start: 2021-03-24 | End: 2021-09-16

## 2021-03-24 ASSESSMENT — PATIENT HEALTH QUESTIONNAIRE - PHQ9
SUM OF ALL RESPONSES TO PHQ QUESTIONS 1-9: 0
SUM OF ALL RESPONSES TO PHQ9 QUESTIONS 1 & 2: 0
2. FEELING DOWN, DEPRESSED OR HOPELESS: 0
SUM OF ALL RESPONSES TO PHQ QUESTIONS 1-9: 0
1. LITTLE INTEREST OR PLEASURE IN DOING THINGS: 0

## 2021-03-24 NOTE — PROGRESS NOTES
abused: None     Forced sexual activity: None   Other Topics Concern    None   Social History Narrative    None     Allergies   Allergen Reactions    Cephalexin Nausea And Vomiting       Review of Systems   Constitutional: Negative for fatigue and fever. HENT: Positive for congestion. Eyes: Negative for photophobia and visual disturbance. Respiratory: Negative for cough and shortness of breath. Cardiovascular: Negative for chest pain and palpitations. Gastrointestinal: Negative for abdominal pain and nausea. Genitourinary: Negative for decreased urine volume, testicular pain and urgency. Skin: Negative for rash. Neurological: Positive for numbness. Negative for tremors and syncope. Hematological: Does not bruise/bleed easily. Psychiatric/Behavioral: Negative for suicidal ideas. Vitals:    03/24/21 1459   BP: 118/80   Pulse: 92   Resp: 17   Temp: 96.7 °F (35.9 °C)   TempSrc: Temporal   SpO2: 96%   Weight: 231 lb (104.8 kg)       Physical Exam  Constitutional:       Appearance: He is well-developed. HENT:      Head: Normocephalic and atraumatic. Mouth/Throat:      Mouth: Mucous membranes are moist.   Eyes:      Conjunctiva/sclera: Conjunctivae normal.      Pupils: Pupils are equal, round, and reactive to light. Neck:      Musculoskeletal: Normal range of motion. Cardiovascular:      Rate and Rhythm: Normal rate and regular rhythm. Pulmonary:      Effort: Pulmonary effort is normal. No respiratory distress. Breath sounds: No wheezing. Abdominal:      General: Bowel sounds are normal. There is no distension. Musculoskeletal: Normal range of motion. Skin:     General: Skin is warm. Coloration: Skin is not jaundiced. Neurological:      Mental Status: He is alert. Cranial Nerves: No cranial nerve deficit. Psychiatric:         Mood and Affect: Mood normal.        Assessment/Plan  Richard Amor was seen today for medication refill.     Diagnoses and all orders for this visit:    Carpal tunnel syndrome on both sides  -     EMG; Future    Hypotestosteronemia  -     testosterone cypionate (DEPOTESTOTERONE CYPIONATE) 200 MG/ML injection; 1 ml IM every 2 weeks  -     PSA Screening; Future  -     Testosterone Free And Total Male; Future    Arm numbness  -     EMG; Future    Encounter for screening for HIV  -     HIV Screen; Future    Encounter for hepatitis C screening test for low risk patient  -     Hepatitis C Antibody; Future        Return in about 3 months (around 6/24/2021), or if symptoms worsen or fail to improve.     Andres Nails MD

## 2021-03-30 ASSESSMENT — ENCOUNTER SYMPTOMS
PHOTOPHOBIA: 0
SHORTNESS OF BREATH: 0
NAUSEA: 0
COUGH: 0
ABDOMINAL PAIN: 0

## 2021-04-19 ENCOUNTER — TELEPHONE (OUTPATIENT)
Dept: PRIMARY CARE CLINIC | Age: 46
End: 2021-04-19

## 2021-04-29 ENCOUNTER — HOSPITAL ENCOUNTER (OUTPATIENT)
Dept: NEUROLOGY | Age: 46
Discharge: HOME | End: 2021-04-29
Payer: COMMERCIAL

## 2021-04-29 DIAGNOSIS — G56.03 CARPAL TUNNEL SYNDROME ON BOTH SIDES: ICD-10-CM

## 2021-04-29 DIAGNOSIS — R20.0 ARM NUMBNESS: ICD-10-CM

## 2021-04-29 PROCEDURE — 95912 NRV CNDJ TEST 11-12 STUDIES: CPT | Performed by: PSYCHIATRY & NEUROLOGY

## 2021-04-29 PROCEDURE — 95886 MUSC TEST DONE W/N TEST COMP: CPT

## 2021-04-29 PROCEDURE — 95911 NRV CNDJ TEST 9-10 STUDIES: CPT

## 2021-04-29 PROCEDURE — 95886 MUSC TEST DONE W/N TEST COMP: CPT | Performed by: PSYCHIATRY & NEUROLOGY

## 2021-04-29 NOTE — PROCEDURES
Nichole Delarosa [address]                      [facility], [address] [facility]                             ELECTROMYOGRAM REPORT    PATIENT NAME: Peter Cuellar                  :        1975  MED REC NO:   [id number]                            ROOM:  ACCOUNT NO:   [de-identified]                           ADMIT DATE: 2021  PROVIDER:     Darryl Sewell MD    DATE OF EM2021    REASON FOR STUDY:  Neurophysiological studies are requested for the  patient's underlying numbness in the hands. FINDINGS:  MOTOR NERVE CONDUCTION STUDIES:  Motor nerve conduction study of the  right median nerve shows normal peak latencies, borderline amplitudes,  and normal conduction velocity. Motor nerve conduction study of the  left median nerve shows normal amplitudes, latency, and conduction  velocity. Motor nerve conduction study of the right ulnar nerve shows  normal amplitudes, latency, and conduction velocity. Motor nerve  conduction study of the left ulnar nerve shows normal amplitudes,  latency, and conduction velocity. F-wave responses are normal.    SENSORY NERVE CONDUCTION STUDIES:  Sensory nerve conduction study of the  right median nerve recorded in digit 2 shows prolonged latencies, normal  amplitudes, and decreased conduction velocity. Further mid palm  stimulation was obtained to confirm findings and shows prolonged  latencies, normal amplitudes, and decreased conduction velocity. The  left median digit 2 examination shows borderline peak latencies, normal  amplitudes, and borderline conduction velocity. The left median mid  palm stimulation shows mildly prolonged latencies, normal amplitudes,  and decreased conduction velocity. The right ulnar digit 2 examination  shows normal amplitudes, latency, and conduction velocity. The left  ulnar digit 2 examination shows normal amplitudes, latency, and  conduction velocity.   The right ulnar mixed orthodromic study shows normal peak latency, low amplitudes, and normal conduction velocity. The left ulnar mixed orthodromic study shows normal peak latency, normal  amplitudes, and normal conduction velocity. Radial sensory nerve  conduction studies are normal.    Needle electrode examination of the above-sampled muscles is normal.    F-responses are normal.    IMPRESSION:  1. Moderate right median nerve neuropathy at the wrist suggesting  carpal tunnel syndrome. These findings are based on prolonged latencies  seen both in the antidromic and orthodromic sensory nerve recordings. 2.  Borderline early left median nerve neuropathy at the wrist  suggesting carpal tunnel syndrome. Multiple sensory nerve conduction studies are evaluated to avoid any  artifact or temperature differences. This test is personally performed and interpreted by me. Thank you Dr. Rikki Garay for asking us to assist in the care of this  patient.         Tomer Puentes MD    D: 04/29/2021 8:52:00       T: 04/29/2021 9:00:51     [email or web address]  Job#: [id number]     Doc#: [id number]    CC:

## 2021-05-07 DIAGNOSIS — G56.03 BILATERAL CARPAL TUNNEL SYNDROME: Primary | ICD-10-CM

## 2021-06-01 ENCOUNTER — OFFICE VISIT (OUTPATIENT)
Dept: ORTHOPEDIC SURGERY | Age: 46
End: 2021-06-01
Payer: COMMERCIAL

## 2021-06-01 VITALS
OXYGEN SATURATION: 100 % | TEMPERATURE: 98 F | DIASTOLIC BLOOD PRESSURE: 80 MMHG | HEART RATE: 80 BPM | SYSTOLIC BLOOD PRESSURE: 130 MMHG

## 2021-06-01 DIAGNOSIS — M65.311 TRIGGER THUMB, RIGHT THUMB: ICD-10-CM

## 2021-06-01 DIAGNOSIS — G56.03 BILATERAL CARPAL TUNNEL SYNDROME: Primary | ICD-10-CM

## 2021-06-01 PROCEDURE — G8417 CALC BMI ABV UP PARAM F/U: HCPCS | Performed by: ORTHOPAEDIC SURGERY

## 2021-06-01 PROCEDURE — 1036F TOBACCO NON-USER: CPT | Performed by: ORTHOPAEDIC SURGERY

## 2021-06-01 PROCEDURE — 99203 OFFICE O/P NEW LOW 30 MIN: CPT | Performed by: ORTHOPAEDIC SURGERY

## 2021-06-01 PROCEDURE — G8427 DOCREV CUR MEDS BY ELIG CLIN: HCPCS | Performed by: ORTHOPAEDIC SURGERY

## 2021-06-01 ASSESSMENT — ENCOUNTER SYMPTOMS
CHEST TIGHTNESS: 0
ABDOMINAL DISTENTION: 0

## 2021-06-01 NOTE — PROGRESS NOTES
Subjective:      Patient ID: Janay Hall is a 42 y.o. male who presents today for:  Chief Complaint   Patient presents with    Carpal Tunnel     bilateral carpal tunnel, EMG 04/29/21 - R CTS, moderate - wants surgery        HPI  Patient describes having had symptoms of tingling numbness in his right and his left hand  He has to shake his hand to alleviate this discomfort  He has also worn a wrist brace in the past  Occasional triggering is experienced in the right thumb and in the left ring finger      Past Medical History:   Diagnosis Date    Hypotestosteronemia      No past surgical history on file. Social History     Socioeconomic History    Marital status:      Spouse name: Not on file    Number of children: Not on file    Years of education: Not on file    Highest education level: Not on file   Occupational History    Not on file   Tobacco Use    Smoking status: Never Smoker    Smokeless tobacco: Never Used   Substance and Sexual Activity    Alcohol use: Yes     Comment: very little    Drug use: No    Sexual activity: Not on file   Other Topics Concern    Not on file   Social History Narrative    Not on file     Social Determinants of Health     Financial Resource Strain: Low Risk     Difficulty of Paying Living Expenses: Not hard at all   Food Insecurity: No Food Insecurity    Worried About [address] [facility] in the Last Year: Never true    [address] in the Last Year: Never true   Transportation Needs:     Lack of Transportation (Medical):      Lack of Transportation (Non-Medical):    Physical Activity:     Days of Exercise per Week:     Minutes of Exercise per Session:    Stress:     Feeling of Stress :    Social Connections:     Frequency of Communication with Friends and Family:     Frequency of Social Gatherings with Friends and Family:     Attends [employer] Services:     Active Member of Clubs or Organizations:     Attends Club or Organization Meetings:    Shadi Correa Marital Status:    Intimate Partner Violence:     Fear of Current or Ex-Partner:     Emotionally Abused:     Physically Abused:     Sexually Abused:      No family history on file. Allergies   Allergen Reactions    Cephalexin Nausea And Vomiting     Current Outpatient Medications on File Prior to Visit   Medication Sig Dispense Refill    testosterone cypionate (DEPOTESTOTERONE CYPIONATE) 200 MG/ML injection 1 ml IM every 2 weeks 2 mL 5    Testosterone (ANDROGEL PUMP) 20.25 MG/ACT (1.62%) GEL gel 2 pump depression 1 Bottle 3    testosterone (ANDROGEL) 25 MG/2.5GM (1%) GEL 1 % gel Apply 5 g topically daily for 180 days. 30 Package 5    testosterone (ANDROGEL) 50 MG/5GM (1%) GEL 1% gel Apply 0.05 g topically daily for 180 days. 30 Package 5    CPAP Machine MISC by Does not apply route Start CPAP at 9 cm of water 1 each 0    sildenafil (VIAGRA) 100 MG tablet Take 1 tablet by mouth as needed for Erectile Dysfunction 30 tablet 11     No current facility-administered medications on file prior to visit. Controlled Substance Monitoring:    Acute and Chronic Pain Monitoring:   RX Monitoring 6/30/2020   Attestation -   Periodic Controlled Substance Monitoring Possible medication side effects, risk of tolerance/dependence & alternative treatments discussed. ;No signs of potential drug abuse or diversion identified. ;Assessed functional status. Chronic Pain > 80 MEDD -         Review of Systems   Constitutional: Negative for activity change and appetite change. Respiratory: Negative for chest tightness. Cardiovascular: Negative for chest pain. Gastrointestinal: Negative for abdominal distention. Genitourinary: Negative for difficulty urinating. Neurological: Negative for headaches.        Objective:   /80   Pulse 80   Temp 98 °F (36.7 °C)   SpO2 100%     Physical Exam:    Right hand  The contours of the hand appear satisfactory  There is no wasting of the small muscles of the hand  He has full flexion extension of all the fingers and thumb  Mild discomfort felt along the carpal canal area  Tinel sign is negative  Phalen sign is positive  Wrist motion is largely pain-free  Dorsiflexion of the wrist is up to about 25 degrees, palmar flexion up to 85  But upon the wrist is at 4+ by 5  Forearm is soft  No discomfort along the ulnar nerve in the cubital tunnel region  Mild tenderness along the A1 pulley in the thumb  No tenderness in the rest of the A1 pulleys the other fingers  No obvious triggering present in the A1 pulley of the thumb, though he states that he has done this once or twice    Left hand  Sensations are intact in all the fingers  Mild discomfort felt in the carpal canal area  Tinel sign is negative  Phalen sign is mildly positive  Wrist dorsi and palmar flexion the same as the opposite hand, they are full and complete, the motor power being 4+ by 5  No discomfort in the cubital tunnel along the ulnar nerve      Left hand      Diagnostic Imagin. Moderate right median nerve neuropathy at the wrist suggesting  carpal tunnel syndrome. These findings are based on prolonged latencies  seen both in the antidromic and orthodromic sensory nerve recordings. 2.  Borderline early left median nerve neuropathy at the wrist  suggesting carpal tunnel syndrome.     Multiple sensory nerve conduction studies are evaluated to avoid any  artifact or temperature differences.         Assessment:       Diagnosis Orders   1. Bilateral carpal tunnel syndrome     2.  Trigger thumb, right thumb       The patient was explained the various treatment options  He has tried the brace before and this has not worked, he has taken over-the-counter anti-inflammatory medications as well  The symptoms are progressively worsened, and he strongly wishes to consider surgery  Surgical procedure was explained in detail, the risks and benefits of surgery were also discussed  Risk of anesthesia which is a Biers block, risk of injury to the vessel, nerve, tendon. Postoperative complications such as hematoma formation, infection, DVT, continued pain in the hand, neuroma formation, loss of sensations in the finger, wound healing problems, possible need for second surgery, recurrence of the symptoms, complications from her medical condition have all been discussed  Patient understands all the risks and benefits and consents for the surgical procedure   Plan:           Surgery Phone: Marielos Roper   Surgery Fax: [phone]    Phone: [phone]          Fax: [phone]: Surgery Scheduling, PAT & PRE-OP Order Form  Call to advance Holly at [phone] at least 24 hours prior to date of service     Surgery Location: [facility] Surgery: [address] [place] [address] Mau Guerrero MD Surgery Date:   Time: am   Patient's Name: Antoino Rivera : 1975    #:  [id number]  Gender: male  Home Phone:  [phone] Cell Phone: [phone]  Emergency Contact:  Sandeep Mari   Phone: [phone]  Payor: [address] [place] [address] 99 /  /  /    ID No.: [id number]      PROVIDER TO COMPLETE:  Diagnosis: Right hand carpal tunnel syndrome, right trigger thumb Procedure/Consent: Carpal tunnel decompression-right, possible trigger thumb release  Case Comments/Implants: N/A   Surgery Scheduled as:  Outpatient  Anesthesia Requested: Leonardo Chang  Referring Family Doctor: Rachel De La Rosa MD   PAT  [x] [facility] Date/Time:                                                            [x] History & Physical [] Physician will Provide [] Attached [] Dictated [] Other  [x] Follow Anesthesia Pre-Op Orders for X-rays, [facility]     [x] SN & PT to evaluate and treat/educate disease management, medications, home safety & equipment needs for total joint patients  [] Other: ____________________________________________________  Consults: Medical/Cardiac

## 2021-06-01 NOTE — PATIENT INSTRUCTIONS
Patient Education        Carpal Tunnel Syndrome: Care Instructions  Overview     Carpal tunnel syndrome is numbness, tingling, weakness, and pain in your hand, wrist, and sometimes forearm. It is caused by pressure on the median nerve. This nerve and several tough tissues called tendons run through a space in the wrist. This space is called the carpal tunnel. The repeated hand motions used in work and some hobbies and sports can put pressure on the median nerve. Pregnancy can cause carpal tunnel syndrome. Several conditions, such as diabetes, arthritis, and an underactive thyroid, can also cause it. You may be able to limit an activity or change the way you do it to reduce your symptoms. You also can take other steps to feel better. If your symptoms are mild, 1 to 2 weeks of home treatment are likely to ease your pain. Surgery is needed only if other treatments do not work. Follow-up care is a key part of your treatment and safety. Be sure to make and go to all appointments, and call your doctor if you are having problems. It's also a good idea to know your test results and keep a list of the medicines you take. How can you care for yourself at home? · If possible, stop or reduce the activity that causes your symptoms. If you cannot stop the activity, take frequent breaks to rest and stretch or change hand positions to do a task. Try switching hands, such as when using a computer mouse. · Try to avoid bending or twisting your wrists. · Ask your doctor if you can take an over-the-counter pain medicine, such as acetaminophen (Tylenol), ibuprofen (Advil, Motrin), or naproxen (Aleve). Be safe with medicines. Read and follow all instructions on the label. · If your doctor prescribes corticosteroid medicine to help reduce pain and swelling, take it exactly as prescribed. Call your doctor if you think you are having a problem with your medicine.   · Put ice or a cold pack on your wrist for 10 to 20 minutes at a time to ease pain. Put a thin cloth between the ice and your skin. · If your doctor or your physical or occupational therapist tells you to wear a wrist splint, wear it as directed to keep your wrist in a neutral position. This also eases pressure on your median nerve. · Ask your doctor whether you should have physical or occupational therapy to learn how to do tasks differently. · Try a yoga class to stretch your muscles and build strength in your hands and wrists. Yoga has been shown to ease carpal tunnel symptoms. To prevent carpal tunnel  · When working at a [employer], keep your hands and wrists in line with your forearms. Hold your elbows close to your sides. Take a break every 10 to 15 minutes. · Try these exercises:  ? Warm up: Rotate your wrist up, down, and from side to side. Repeat this 4 times. Stretch your fingers far apart, relax them, then stretch them again. Repeat 4 times. Stretch your thumb by pulling it back gently, holding it, and then releasing it. Repeat 4 times. ? Prayer stretch: Start with your palms together in front of your chest just below your chin. Slowly lower your hands toward your waistline while keeping your hands close to your stomach and your palms together until you feel a mild to moderate stretch under your forearms. Hold for 10 to 20 seconds. Repeat 4 times. ? Wrist flexor stretch: Hold your arm in front of you with your palm up. Bend your wrist, pointing your hand toward the floor. With your other hand, gently bend your wrist further until you feel a mild to moderate stretch in your forearm. Hold for 10 to 20 seconds. Repeat 4 times. ? Wrist extensor stretch: Repeat the steps for the wrist flexor stretch, but begin with your extended hand palm down. · Squeeze a rubber exercise ball several times a day to keep your hands and fingers strong. · Avoid holding objects (such as a book) in one position for a long time. When possible, use your whole hand to grasp an object. Using just the thumb and index finger can put stress on the wrist.  · Do not smoke. It can make this condition worse by reducing blood flow to the median nerve. If you need help quitting, talk to your doctor about stop-smoking programs and medicines. These can increase your chances of quitting for good. When should you call for help? Watch closely for changes in your health, and be sure to contact your doctor if:    · Your pain or other problems do not get better with home care.     · You want more information about physical or occupational therapy.     · You have side effects of your corticosteroid medicine, such as:  ? Weight gain. ? Mood changes. ? Trouble sleeping. ? Bruising easily.     · You have any other problems with your medicine. Where can you learn more? Go to https://"[email or web address].Waze. org and sign in to your [employer] account. Enter R432 in the Taigen box to learn more about \"Carpal Tunnel Syndrome: Care Instructions. \"     If you do not have an account, please click on the \"Sign Up Now\" link. Current as of: November 16, 2020               Content Version: 12.8  © 2749-3144 [employer]wise, Incorporated. Care instructions adapted under license by [facility] ([facility]). If you have questions about a medical condition or this instruction, always ask your healthcare professional. Rachid Allred any warranty or liability for your use of this information.

## 2021-06-09 ENCOUNTER — PATIENT MESSAGE (OUTPATIENT)
Dept: ORTHOPEDIC SURGERY | Age: 46
End: 2021-06-09

## 2021-09-14 DIAGNOSIS — E34.9 HYPOTESTOSTERONEMIA: ICD-10-CM

## 2021-09-16 RX ORDER — TESTOSTERONE CYPIONATE 200 MG/ML
INJECTION INTRAMUSCULAR
Qty: 2 ML | Refills: 0 | Status: SHIPPED | OUTPATIENT
Start: 2021-09-16 | End: 2021-10-06

## 2021-09-16 NOTE — TELEPHONE ENCOUNTER
Requesting medication refill.  Please approve or deny this request.    Rx requested:  Requested Prescriptions     Pending Prescriptions Disp Refills    testosterone cypionate (DEPOTESTOTERONE CYPIONATE) 200 MG/ML injection [Pharmacy Med Name: TESTOSTERONE  MG/ML] 2 mL 0     Sig: INJECT 1 ML INTRAMUSCULARLY EVERY 2 WEEKS       Last Office Visit:   3/24/2021    Last Filled:      Last Labs:      Next Visit Date:  Future Appointments   Date Time Provider Rony Alicia   9/29/2021  3:00 PM Joseph Guan MD [address] [facility]

## 2021-10-06 ENCOUNTER — OFFICE VISIT (OUTPATIENT)
Dept: FAMILY MEDICINE CLINIC | Age: 46
End: 2021-10-06
Payer: COMMERCIAL

## 2021-10-06 VITALS
SYSTOLIC BLOOD PRESSURE: 130 MMHG | HEART RATE: 91 BPM | BODY MASS INDEX: 37.67 KG/M2 | DIASTOLIC BLOOD PRESSURE: 84 MMHG | OXYGEN SATURATION: 98 % | WEIGHT: 240 LBS | HEIGHT: 67 IN

## 2021-10-06 DIAGNOSIS — Z11.4 ENCOUNTER FOR SCREENING FOR HIV: ICD-10-CM

## 2021-10-06 DIAGNOSIS — B02.9 HERPES ZOSTER WITHOUT COMPLICATION: Primary | ICD-10-CM

## 2021-10-06 DIAGNOSIS — E78.5 HYPERLIPIDEMIA, UNSPECIFIED HYPERLIPIDEMIA TYPE: ICD-10-CM

## 2021-10-06 DIAGNOSIS — Z11.59 NEED FOR HEPATITIS C SCREENING TEST: ICD-10-CM

## 2021-10-06 DIAGNOSIS — Z00.00 PREVENTATIVE HEALTH CARE: ICD-10-CM

## 2021-10-06 DIAGNOSIS — R73.9 HYPERGLYCEMIA: ICD-10-CM

## 2021-10-06 DIAGNOSIS — Z12.5 PROSTATE CANCER SCREENING: ICD-10-CM

## 2021-10-06 DIAGNOSIS — E34.9 HYPOTESTOSTERONEMIA: ICD-10-CM

## 2021-10-06 PROCEDURE — G8484 FLU IMMUNIZE NO ADMIN: HCPCS | Performed by: INTERNAL MEDICINE

## 2021-10-06 PROCEDURE — G8417 CALC BMI ABV UP PARAM F/U: HCPCS | Performed by: INTERNAL MEDICINE

## 2021-10-06 PROCEDURE — G8427 DOCREV CUR MEDS BY ELIG CLIN: HCPCS | Performed by: INTERNAL MEDICINE

## 2021-10-06 PROCEDURE — 99213 OFFICE O/P EST LOW 20 MIN: CPT | Performed by: INTERNAL MEDICINE

## 2021-10-06 PROCEDURE — 1036F TOBACCO NON-USER: CPT | Performed by: INTERNAL MEDICINE

## 2021-10-06 RX ORDER — TESTOSTERONE CYPIONATE 200 MG/ML
INJECTION INTRAMUSCULAR
COMMUNITY
Start: 2021-09-16 | End: 2021-10-20

## 2021-10-06 RX ORDER — NAPROXEN 500 MG/1
500 TABLET ORAL 2 TIMES DAILY WITH MEALS
COMMUNITY
Start: 2021-10-04

## 2021-10-06 RX ORDER — LIDOCAINE 50 MG/G
2 PATCH TOPICAL EVERY 24 HOURS
Qty: 60 PATCH | Refills: 3 | Status: SHIPPED | OUTPATIENT
Start: 2021-10-06

## 2021-10-06 RX ORDER — OXYCODONE HYDROCHLORIDE AND ACETAMINOPHEN 5; 325 MG/1; MG/1
1 TABLET ORAL EVERY 6 HOURS PRN
Qty: 28 TABLET | Refills: 0 | Status: SHIPPED | OUTPATIENT
Start: 2021-10-06 | End: 2021-10-13

## 2021-10-06 RX ORDER — VALACYCLOVIR HYDROCHLORIDE 1 G/1
1000 TABLET, FILM COATED ORAL 3 TIMES DAILY
COMMUNITY
Start: 2021-10-04 | End: 2021-10-11

## 2021-10-06 SDOH — ECONOMIC STABILITY: TRANSPORTATION INSECURITY
IN THE PAST 12 MONTHS, HAS LACK OF TRANSPORTATION KEPT YOU FROM MEETINGS, WORK, OR FROM GETTING THINGS NEEDED FOR DAILY LIVING?: NO

## 2021-10-06 SDOH — ECONOMIC STABILITY: FOOD INSECURITY: WITHIN THE PAST 12 MONTHS, YOU WORRIED THAT YOUR FOOD WOULD RUN OUT BEFORE YOU GOT MONEY TO BUY MORE.: NEVER TRUE

## 2021-10-06 SDOH — ECONOMIC STABILITY: FOOD INSECURITY: WITHIN THE PAST 12 MONTHS, THE FOOD YOU BOUGHT JUST DIDN'T LAST AND YOU DIDN'T HAVE MONEY TO GET MORE.: NEVER TRUE

## 2021-10-06 SDOH — ECONOMIC STABILITY: TRANSPORTATION INSECURITY
IN THE PAST 12 MONTHS, HAS THE LACK OF TRANSPORTATION KEPT YOU FROM MEDICAL APPOINTMENTS OR FROM GETTING MEDICATIONS?: NO

## 2021-10-06 ASSESSMENT — SOCIAL DETERMINANTS OF HEALTH (SDOH): HOW HARD IS IT FOR YOU TO PAY FOR THE VERY BASICS LIKE FOOD, HOUSING, MEDICAL CARE, AND HEATING?: NOT HARD AT ALL

## 2021-10-06 NOTE — PROGRESS NOTES
Jose Maria Isaac 42 y.o. male presents today with   Chief Complaint   Patient presents with    Herpes Zoster     F/U from urgent care. Pain more in back at night time       HPI had a cold a few day before skin break out on left side. Seen in walk in    Past Medical History:   Diagnosis Date    Hypotestosteronemia      Patient Active Problem List    Diagnosis Date Noted    Arm numbness     BRENNON (obstructive sleep apnea)     Hypotestosteronemia      No past surgical history on file. No family history on file. Social History     Socioeconomic History    Marital status:      Spouse name: None    Number of children: None    Years of education: None    Highest education level: None   Occupational History    None   Tobacco Use    Smoking status: Never Smoker    Smokeless tobacco: Never Used   Substance and Sexual Activity    Alcohol use: Yes     Comment: very little    Drug use: No    Sexual activity: None   Other Topics Concern    None   Social History Narrative    None     Social Determinants of Health     Financial Resource Strain: Low Risk     Difficulty of Paying Living Expenses: Not hard at all   Food Insecurity: No Food Insecurity    Worried About Running Out of Food in the Last Year: Never true    Karen of Food in the Last Year: Never true   Transportation Needs: No Transportation Needs    Lack of Transportation (Medical): No    Lack of Transportation (Non-Medical):  No   Physical Activity:     Days of Exercise per Week:     Minutes of Exercise per Session:    Stress:     Feeling of Stress :    Social Connections:     Frequency of Communication with Friends and Family:     Frequency of Social Gatherings with Friends and Family:     Attends Hoahaoism Services:     Active Member of Clubs or Organizations:     Attends Club or Organization Meetings:     Marital Status:    Intimate Partner Violence:     Fear of Current or Ex-Partner:     Emotionally Abused:     Physically Abused:     Sexually Abused: Allergies   Allergen Reactions    Cephalexin Nausea And Vomiting       Review of Systems   Constitutional: Negative for fatigue and fever. HENT: Negative for trouble swallowing and voice change. Eyes: Negative for photophobia and visual disturbance. Respiratory: Negative for choking and shortness of breath. Cardiovascular: Negative for chest pain and palpitations. Gastrointestinal: Negative for nausea and vomiting. Genitourinary: Negative for decreased urine volume, testicular pain and urgency. Skin: Positive for rash. Neurological: Negative for tremors and syncope. Hematological: Does not bruise/bleed easily. Psychiatric/Behavioral: Negative for suicidal ideas. Vitals:    10/06/21 1408   BP: 130/84   Site: Right Upper Arm   Cuff Size: Large Adult   Pulse: 91   SpO2: 98%   Weight: 240 lb (108.9 kg)   Height: 5' 7\" (1.702 m)       Physical Exam  Constitutional:       Appearance: He is well-developed. HENT:      Head: Normocephalic and atraumatic. Eyes:      Conjunctiva/sclera: Conjunctivae normal.      Pupils: Pupils are equal, round, and reactive to light. Cardiovascular:      Rate and Rhythm: Normal rate and regular rhythm. Pulmonary:      Effort: Pulmonary effort is normal. No respiratory distress. Breath sounds: No wheezing. Abdominal:      General: Bowel sounds are normal.      Palpations: Abdomen is soft. Musculoskeletal:         General: Normal range of motion. Cervical back: Normal range of motion. Skin:     General: Skin is dry. Coloration: Skin is not jaundiced. Neurological:      Mental Status: He is alert. Assessment/Plan  Dioni Hernandez was seen today for herpes zoster.     Diagnoses and all orders for this visit:    Herpes zoster without complication  -     lidocaine (LIDODERM) 5 %; Place 2 patches onto the skin every 24 hours 12 hours on, 12 hours off.  -     oxyCODONE-acetaminophen (PERCOCET) 5-325 MG per tablet; Take 1 tablet by mouth every 6 hours as needed for Pain for up to 7 days. Intended supply: 7 days. Take lowest dose possible to manage pain    Preventative health care  -     PSA Screening; Future  -     Comprehensive Metabolic Panel; Future  -     CBC With Auto Differential; Future  -     Hemoglobin A1C; Future  -     Lipid Panel; Future    Encounter for screening for HIV  -     HIV Screen; Future    Need for hepatitis C screening test  -     Hepatitis C Antibody; Future    Hypotestosteronemia  -     Testosterone Free And Total Male; Future    Prostate cancer screening  -     PSA Screening; Future    Hyperglycemia  -     Comprehensive Metabolic Panel; Future  -     Hemoglobin A1C; Future    Hyperlipidemia, unspecified hyperlipidemia type  -     Comprehensive Metabolic Panel; Future  -     Lipid Panel; Future        No follow-ups on file.     Pauline Campoverde MD

## 2021-10-13 ASSESSMENT — ENCOUNTER SYMPTOMS
PHOTOPHOBIA: 0
VOICE CHANGE: 0
SHORTNESS OF BREATH: 0
NAUSEA: 0
CHOKING: 0
TROUBLE SWALLOWING: 0
VOMITING: 0

## 2023-05-10 NOTE — PROGRESS NOTES
 Testosterone Cypionate 200 MG/ML KIT     Sig: Inject 1 cc monthly     Dispense:  1 kit     Refill:  5     Medications Discontinued During This Encounter   Medication Reason    predniSONE (DELTASONE) 20 MG tablet Therapy completed    sildenafil (VIAGRA) 100 MG tablet Reorder    Testosterone Cypionate 200 MG/ML KIT Reorder     Return in about 6 months (around 4/3/2018) for testost.    Call or return to clinic prn if these symptoms worsen or fail to improve as anticipated.       Gucci Holm, DO Quality 130: Documentation Of Current Medications In The Medical Record: Current Medications Documented Quality 110: Preventive Care And Screening: Influenza Immunization: Influenza Immunization Administered during Influenza season Detail Level: Detailed

## 2023-06-01 PROBLEM — R79.89 LOW TESTOSTERONE IN MALE: Status: ACTIVE | Noted: 2023-06-01

## 2023-06-01 PROBLEM — F32.A DEPRESSION: Status: ACTIVE | Noted: 2023-06-01

## 2023-06-01 PROBLEM — E66.1 CLASS 2 DRUG-INDUCED OBESITY IN ADULT: Status: ACTIVE | Noted: 2023-06-01

## 2023-06-01 PROBLEM — E66.812 CLASS 2 DRUG-INDUCED OBESITY IN ADULT: Status: ACTIVE | Noted: 2023-06-01

## 2023-06-01 PROBLEM — R53.83 FATIGUE: Status: ACTIVE | Noted: 2023-06-01

## 2023-06-01 PROBLEM — L82.1 SEBORRHEIC KERATOSIS: Status: ACTIVE | Noted: 2023-06-01

## 2023-06-01 RX ORDER — CITALOPRAM 10 MG/1
10 TABLET ORAL DAILY
COMMUNITY
Start: 2023-05-28 | End: 2023-11-30

## 2023-06-01 RX ORDER — TESTOSTERONE CYPIONATE 200 MG/ML
INJECTION, SOLUTION INTRAMUSCULAR
COMMUNITY
Start: 2022-07-01 | End: 2023-07-05 | Stop reason: SDUPTHER

## 2023-06-01 RX ORDER — SYRINGE W-NEEDLE,DISPOSAB,3 ML 25GX5/8"
SYRINGE, EMPTY DISPOSABLE MISCELLANEOUS
COMMUNITY
Start: 2022-07-12 | End: 2023-09-11 | Stop reason: SDUPTHER

## 2023-06-02 NOTE — PROGRESS NOTES
No chief complaint on file.      HPI: Jeronimo Christensen is a pleasant 48 y.o. male presenting for follow-up of Depression and other chronic conditions .  I last saw the patient on ***.         ROS    Except positives as noted in the CC & HPI   Constitutional: Denies fevers, chills, night sweats, fatigue, weight changes, change in appetite   Eyes: Denies blurry vision, double vision   ENT: Denies otalgia, trouble hearing, tinnitus, vertigo, nasal congestion, rhinorrhea, sore throat   Neck: Denies swelling, masses   Cardiovascular: Denies chest pain, palpitations, edema, orthopnea, syncope   Respiratory: Denies dyspnea, cough, wheezing, postural nocturnal dyspnea   Gastrointestinal: Denies abdominal pain, nausea, vomiting, diarrhea, constipation, melena, hematochezia   Genitourinary: Denies dysuria, hematuria, frequency, urgency   Musculoskeletal: Denies back pain, neck pain, arthralgias, myalgias   Integumentary: Denies skin lesions, rashes, masses   Neurological: Denies dizziness, headaches, confusion, limb weakness, paresthesias, syncope, convulsions   Psychiatric: Denies depression, anxiety, homicidal ideations, suicidal ideations, sleep disturbances   Endocrine: Denies polyphagia, polydipsia, polyuria, weakness, hair thinning, heat intolerance, cold intolerance, weight changes   Heme/Lymph: Denies easy bruising, easy bleeding, swollen glands     There were no vitals filed for this visit.    PHYSICAL EXAM    GENERAL APPEARANCE: well-developed, well-nourished, 48 y.o. male in no acute distress.  SKIN: warm, pink and dry without rash or concerning lesions.  MENTAL STATUS: alert and oriented × 3. Normal mood and affect appropriate to mood.  EARS: TMs shiny and move well with insufflation. Ear canals are clear bilaterally.  NECK: supple without lymphadenopathy. Carotid pulses are normal without bruits. Thyroid - is normal in midline without nodules.  CHEST: lungs are clear to auscultation without rales, rhonchi or  wheezes.  HEART: regular, rate and rhythm without murmurs, rubs or gallops.  ABDOMEN: soft, flat, nondistended. No masses, hepatomegaly or splenomegaly is noted.  EXTREMITIES: no cyanosis, clubbing or edema. Pedal pulses are 2+ normal at the dorsalis pedis and posterior tibial pulses bilaterally.  NEUROLOGICAL: cranial nerves II through XII are grossly intact. Motor strength 5/5 at all fours. DTRs are 2+ normal at all fours bilaterally and symmetrically.      Below is the patient's most recent value for Albumin, ALT, AST, BUN, Calcium, Chloride, Cholesterol, CO2, Creatinine, GFR, Glucose, HDL, Hematocrit, Hemoglobin, Hemoglobin A1C, LDL, Magnesium, Phosphorus, Platelets, Potassium, PSA, Sodium, Triglycerides, and WBC.   Lab Results   Component Value Date    ALBUMIN 4.5 07/05/2022    ALT 44 07/05/2022    AST 27 07/05/2022    BUN 20 07/05/2022    CALCIUM 9.3 07/05/2022     07/05/2022    CHOL 194 07/05/2022    CO2 27 07/05/2022    CREATININE 1.12 07/05/2022    HDL 39.0 (A) 07/05/2022    HCT 51.6 07/05/2022    HGB 17.0 07/05/2022     07/05/2022    K 4.0 07/05/2022    PSA 0.32 07/05/2022     07/05/2022    TRIG 313 (H) 07/05/2022    WBC 5.1 07/05/2022         ASSESSMENT:  No diagnosis found.    PLAN:  No orders of the defined types were placed in this encounter.      [unfilled]    I have instructed the patient to follow-up with me in *** months or sooner if needed.

## 2023-06-05 ENCOUNTER — OFFICE VISIT (OUTPATIENT)
Dept: PRIMARY CARE | Facility: CLINIC | Age: 48
End: 2023-06-05
Payer: COMMERCIAL

## 2023-06-05 ENCOUNTER — APPOINTMENT (OUTPATIENT)
Dept: PRIMARY CARE | Facility: CLINIC | Age: 48
End: 2023-06-05
Payer: COMMERCIAL

## 2023-06-05 VITALS
WEIGHT: 231.6 LBS | BODY MASS INDEX: 37.22 KG/M2 | OXYGEN SATURATION: 96 % | HEIGHT: 66 IN | TEMPERATURE: 97.6 F | HEART RATE: 69 BPM | SYSTOLIC BLOOD PRESSURE: 124 MMHG | DIASTOLIC BLOOD PRESSURE: 76 MMHG

## 2023-06-05 DIAGNOSIS — E66.01 CLASS 2 SEVERE OBESITY DUE TO EXCESS CALORIES WITH SERIOUS COMORBIDITY AND BODY MASS INDEX (BMI) OF 37.0 TO 37.9 IN ADULT (MULTI): ICD-10-CM

## 2023-06-05 DIAGNOSIS — R53.82 CHRONIC FATIGUE: ICD-10-CM

## 2023-06-05 DIAGNOSIS — F33.0 MILD EPISODE OF RECURRENT MAJOR DEPRESSIVE DISORDER (CMS-HCC): ICD-10-CM

## 2023-06-05 DIAGNOSIS — E78.2 MIXED HYPERLIPIDEMIA: ICD-10-CM

## 2023-06-05 DIAGNOSIS — E34.9 HYPOTESTOSTERONEMIA: Primary | ICD-10-CM

## 2023-06-05 DIAGNOSIS — G47.33 OSA (OBSTRUCTIVE SLEEP APNEA): ICD-10-CM

## 2023-06-05 PROBLEM — R20.0 ARM NUMBNESS: Status: ACTIVE | Noted: 2023-06-05

## 2023-06-05 PROBLEM — E66.1 CLASS 2 DRUG-INDUCED OBESITY IN ADULT: Status: RESOLVED | Noted: 2023-06-01 | Resolved: 2023-06-05

## 2023-06-05 PROBLEM — E66.812 CLASS 2 DRUG-INDUCED OBESITY IN ADULT: Status: RESOLVED | Noted: 2023-06-01 | Resolved: 2023-06-05

## 2023-06-05 PROCEDURE — 1036F TOBACCO NON-USER: CPT | Performed by: FAMILY MEDICINE

## 2023-06-05 PROCEDURE — 3008F BODY MASS INDEX DOCD: CPT | Performed by: FAMILY MEDICINE

## 2023-06-05 PROCEDURE — 99214 OFFICE O/P EST MOD 30 MIN: CPT | Performed by: FAMILY MEDICINE

## 2023-06-05 ASSESSMENT — ENCOUNTER SYMPTOMS
WEAKNESS: 0
DIARRHEA: 0
BLOOD IN STOOL: 0
BACK PAIN: 0
HEMATURIA: 0
DIFFICULTY URINATING: 0
CONSTIPATION: 0
CHEST TIGHTNESS: 0
HEADACHES: 0
NUMBNESS: 0
DYSPHORIC MOOD: 0
EYE DISCHARGE: 0
ADENOPATHY: 0
NECK PAIN: 0
ABDOMINAL PAIN: 0
MYALGIAS: 0
DIZZINESS: 0
NERVOUS/ANXIOUS: 0
FATIGUE: 0
SHORTNESS OF BREATH: 0
VOMITING: 0
COUGH: 0
SORE THROAT: 0
BRUISES/BLEEDS EASILY: 0
ARTHRALGIAS: 0
NAUSEA: 0
ACTIVITY CHANGE: 0

## 2023-06-05 NOTE — PROGRESS NOTES
"Subjective   Patient ID: Jeronimo Christensen is a 48 y.o. male who presents for 3 follow up on Depression and Hypogonadism.    Discuss increased drowsy feeling during day, getting worse.     HPI  Low testosterone  Follow with endocrinology    Depression stable  No suicidal ideation no psychotic or manic symptoms    Obese  Recommend weight loss    High cholesterol stable  Watch diet and follow blood work    Chronic fatigue  Suggest blood work    Sleep apnea stable  Continue CPAP treatment  Review of Systems   Constitutional:  Negative for activity change and fatigue.   HENT:  Negative for congestion and sore throat.    Eyes:  Negative for discharge.   Respiratory:  Negative for cough, chest tightness and shortness of breath.    Cardiovascular:  Negative for chest pain and leg swelling.   Gastrointestinal:  Negative for abdominal pain, blood in stool, constipation, diarrhea, nausea and vomiting.   Endocrine: Negative for cold intolerance and heat intolerance.   Genitourinary:  Negative for difficulty urinating and hematuria.   Musculoskeletal:  Negative for arthralgias, back pain, gait problem, myalgias and neck pain.   Allergic/Immunologic: Negative for environmental allergies.   Neurological:  Negative for dizziness, syncope, weakness, numbness and headaches.   Hematological:  Negative for adenopathy. Does not bruise/bleed easily.   Psychiatric/Behavioral:  Negative for dysphoric mood. The patient is not nervous/anxious.    All other systems reviewed and are negative.      Objective   /76 (BP Location: Right arm, BP Cuff Size: Large adult)   Pulse 69   Temp 36.4 °C (97.6 °F)   Ht 1.676 m (5' 6\")   Wt 105 kg (231 lb 9.6 oz)   SpO2 96%   BMI 37.38 kg/m²    Physical Exam  Vitals and nursing note reviewed.   Constitutional:       General: He is not in acute distress.     Appearance: Normal appearance. He is obese.   HENT:      Head: Normocephalic and atraumatic.      Right Ear: Tympanic membrane, ear canal and " PCP: HONG Schuler  Call back: [phone]    Chinese  #[id number]. Mother caller with patient for this call. Declined triage, general health advice relayed including ED tonight. Caller unsure.     Onset: today  Location / description: hard stomach  Precipitating Factors: constipation  Pain Scale denise/10  Associated Symptoms: hard stool denies blood, LBM 2 days ag0  What improves / worsens symptoms: bearing down for bowel movement  Symptom specific medications: advil  Recent visits (last 3-4 weeks) for same reason or recent surgery: no    PLAN:   Directed to Emergency Department    Patient/Caller refuses to follow recommendations.    Reason for Disposition  • MILD constipation  • [1] MODERATE pain (e.g., interferes with normal activities) AND [2] pain comes and goes (cramps) AND [3] present > 24 hours  (Exception: pain with Vomiting or Diarrhea - see that Guideline)  • Health Information question, no triage required and triager able to answer question    Protocols used: CONSTIPATION-A-, ABDOMINAL PAIN - MALE-A-AH, INFORMATION ONLY CALL - NO TRIAGE-A-AH       external ear normal.      Left Ear: Tympanic membrane, ear canal and external ear normal.      Nose: Nose normal.      Mouth/Throat:      Mouth: Mucous membranes are moist.      Pharynx: Oropharynx is clear. No oropharyngeal exudate or posterior oropharyngeal erythema.   Eyes:      Extraocular Movements: Extraocular movements intact.      Conjunctiva/sclera: Conjunctivae normal.      Pupils: Pupils are equal, round, and reactive to light.   Cardiovascular:      Rate and Rhythm: Normal rate and regular rhythm.      Pulses: Normal pulses.      Heart sounds: Normal heart sounds. No murmur heard.  Pulmonary:      Effort: Pulmonary effort is normal. No respiratory distress.      Breath sounds: Normal breath sounds. No wheezing or rales.   Abdominal:      General: Abdomen is flat. Bowel sounds are normal. There is no distension.      Palpations: Abdomen is soft. There is no mass.      Tenderness: There is no abdominal tenderness.   Musculoskeletal:         General: No swelling or deformity. Normal range of motion.      Cervical back: Normal range of motion and neck supple.      Right lower leg: No edema.      Left lower leg: No edema.   Lymphadenopathy:      Cervical: No cervical adenopathy.   Skin:     General: Skin is warm and dry.      Capillary Refill: Capillary refill takes less than 2 seconds.      Findings: No lesion or rash.   Neurological:      General: No focal deficit present.      Mental Status: He is alert and oriented to person, place, and time.      Cranial Nerves: No cranial nerve deficit.      Motor: No weakness.   Psychiatric:         Mood and Affect: Mood normal.         Behavior: Behavior normal.         Thought Content: Thought content normal.         Judgment: Judgment normal.         Assessment/Plan   Problem List Items Addressed This Visit          Nervous    MASON (obstructive sleep apnea)       Endocrine/Metabolic    Hypotestosteronemia - Primary    Relevant Orders    CBC and Auto Differential     Testosterone,Free and Total       Other    Mild episode of recurrent major depressive disorder (CMS/[facility])    Chronic fatigue    Relevant Orders    Comprehensive Metabolic Panel    TSH with reflex to Free T4 if abnormal    Follow Up In [facility] - PCP     Other Visit Diagnoses       Class 2 severe obesity due to excess calories with serious comorbidity and body mass index (BMI) of 37.0 to 37.9 in adult (CMS/[facility])        Mixed hyperlipidemia        Relevant Orders    Lipid Panel            Patient education provided.  Stay current with age appropriate health maintenance as instructed.  Appointment here or ER with new or worsening symptoms'  Keep appropriate follow-up visit.  Stay current with proper immunizations   Recheck 3 months and as needed  Blood work as above  Discussed at length with patient    Report suicidal ideation report psychotic or manic symptoms

## 2023-06-12 ENCOUNTER — LAB (OUTPATIENT)
Dept: LAB | Facility: LAB | Age: 48
End: 2023-06-12
Payer: COMMERCIAL

## 2023-06-12 DIAGNOSIS — R53.82 CHRONIC FATIGUE: ICD-10-CM

## 2023-06-12 DIAGNOSIS — E78.2 MIXED HYPERLIPIDEMIA: ICD-10-CM

## 2023-06-12 DIAGNOSIS — E34.9 HYPOTESTOSTERONEMIA: ICD-10-CM

## 2023-06-12 LAB
ALANINE AMINOTRANSFERASE (SGPT) (U/L) IN SER/PLAS: 41 U/L (ref 10–52)
ALBUMIN (G/DL) IN SER/PLAS: 4.8 G/DL (ref 3.4–5)
ALKALINE PHOSPHATASE (U/L) IN SER/PLAS: 65 U/L (ref 33–120)
ANION GAP IN SER/PLAS: 14 MMOL/L (ref 10–20)
ASPARTATE AMINOTRANSFERASE (SGOT) (U/L) IN SER/PLAS: 22 U/L (ref 9–39)
BASOPHILS (10*3/UL) IN BLOOD BY AUTOMATED COUNT: 0.04 X10E9/L (ref 0–0.1)
BASOPHILS/100 LEUKOCYTES IN BLOOD BY AUTOMATED COUNT: 0.8 % (ref 0–2)
BILIRUBIN TOTAL (MG/DL) IN SER/PLAS: 1 MG/DL (ref 0–1.2)
CALCIUM (MG/DL) IN SER/PLAS: 9.3 MG/DL (ref 8.6–10.3)
CARBON DIOXIDE, TOTAL (MMOL/L) IN SER/PLAS: 28 MMOL/L (ref 21–32)
CHLORIDE (MMOL/L) IN SER/PLAS: 99 MMOL/L (ref 98–107)
CHOLESTEROL (MG/DL) IN SER/PLAS: 170 MG/DL (ref 0–199)
CHOLESTEROL IN HDL (MG/DL) IN SER/PLAS: 40.9 MG/DL
CHOLESTEROL/HDL RATIO: 4.2
CREATININE (MG/DL) IN SER/PLAS: 1.04 MG/DL (ref 0.5–1.3)
EOSINOPHILS (10*3/UL) IN BLOOD BY AUTOMATED COUNT: 0.05 X10E9/L (ref 0–0.7)
EOSINOPHILS/100 LEUKOCYTES IN BLOOD BY AUTOMATED COUNT: 1 % (ref 0–6)
ERYTHROCYTE DISTRIBUTION WIDTH (RATIO) BY AUTOMATED COUNT: 13.3 % (ref 11.5–14.5)
ERYTHROCYTE MEAN CORPUSCULAR HEMOGLOBIN CONCENTRATION (G/DL) BY AUTOMATED: 33.9 G/DL (ref 32–36)
ERYTHROCYTE MEAN CORPUSCULAR VOLUME (FL) BY AUTOMATED COUNT: 94 FL (ref 80–100)
ERYTHROCYTES (10*6/UL) IN BLOOD BY AUTOMATED COUNT: 5.76 X10E12/L (ref 4.5–5.9)
GFR MALE: 89 ML/MIN/1.73M2
GLUCOSE (MG/DL) IN SER/PLAS: 95 MG/DL (ref 74–99)
HEMATOCRIT (%) IN BLOOD BY AUTOMATED COUNT: 54.3 % (ref 41–52)
HEMOGLOBIN (G/DL) IN BLOOD: 18.4 G/DL (ref 13.5–17.5)
IMMATURE GRANULOCYTES/100 LEUKOCYTES IN BLOOD BY AUTOMATED COUNT: 0.2 % (ref 0–0.9)
LDL: 86 MG/DL (ref 0–99)
LEUKOCYTES (10*3/UL) IN BLOOD BY AUTOMATED COUNT: 5.1 X10E9/L (ref 4.4–11.3)
LYMPHOCYTES (10*3/UL) IN BLOOD BY AUTOMATED COUNT: 1.57 X10E9/L (ref 1.2–4.8)
LYMPHOCYTES/100 LEUKOCYTES IN BLOOD BY AUTOMATED COUNT: 30.6 % (ref 13–44)
MONOCYTES (10*3/UL) IN BLOOD BY AUTOMATED COUNT: 0.43 X10E9/L (ref 0.1–1)
MONOCYTES/100 LEUKOCYTES IN BLOOD BY AUTOMATED COUNT: 8.4 % (ref 2–10)
NEUTROPHILS (10*3/UL) IN BLOOD BY AUTOMATED COUNT: 3.03 X10E9/L (ref 1.2–7.7)
NEUTROPHILS/100 LEUKOCYTES IN BLOOD BY AUTOMATED COUNT: 59 % (ref 40–80)
NON HDL CHOLESTEROL: 129 MG/DL
PLATELETS (10*3/UL) IN BLOOD AUTOMATED COUNT: 169 X10E9/L (ref 150–450)
POTASSIUM (MMOL/L) IN SER/PLAS: 4.1 MMOL/L (ref 3.5–5.3)
PROTEIN TOTAL: 6.9 G/DL (ref 6.4–8.2)
SODIUM (MMOL/L) IN SER/PLAS: 137 MMOL/L (ref 136–145)
THYROTROPIN (MIU/L) IN SER/PLAS BY DETECTION LIMIT <= 0.05 MIU/L: 3.73 MIU/L (ref 0.44–3.98)
TRIGLYCERIDE (MG/DL) IN SER/PLAS: 218 MG/DL (ref 0–149)
UREA NITROGEN (MG/DL) IN SER/PLAS: 16 MG/DL (ref 6–23)
VLDL: 44 MG/DL (ref 0–40)

## 2023-06-12 PROCEDURE — 80053 COMPREHEN METABOLIC PANEL: CPT

## 2023-06-12 PROCEDURE — 80061 LIPID PANEL: CPT

## 2023-06-12 PROCEDURE — 36415 COLL VENOUS BLD VENIPUNCTURE: CPT

## 2023-06-12 PROCEDURE — 84402 ASSAY OF FREE TESTOSTERONE: CPT

## 2023-06-12 PROCEDURE — 84443 ASSAY THYROID STIM HORMONE: CPT

## 2023-06-12 PROCEDURE — 84403 ASSAY OF TOTAL TESTOSTERONE: CPT

## 2023-06-12 PROCEDURE — 85025 COMPLETE CBC W/AUTO DIFF WBC: CPT

## 2023-06-19 LAB
TESTOSTERONE FREE (CHAN): 276.5 PG/ML (ref 35–155)
TESTOSTERONE,TOTAL,LC-MS/MS: 1202 NG/DL (ref 250–1100)

## 2023-08-04 DIAGNOSIS — E34.9 HYPOTESTOSTERONEMIA: ICD-10-CM

## 2023-08-04 RX ORDER — TESTOSTERONE CYPIONATE 200 MG/ML
INJECTION, SOLUTION INTRAMUSCULAR
Qty: 2 ML | Refills: 0 | Status: SHIPPED | OUTPATIENT
Start: 2023-08-04 | End: 2023-09-05

## 2023-08-04 NOTE — TELEPHONE ENCOUNTER
Medication is pended please review for approval/denial    Name:  Jeronimo NGO Amparo  :  [id number]  Specific Pharmacy location:  [facility]  Date of last appointment:  23  Date of next appointment:  23  Best number to reach patient:  [phone] (home)

## 2023-09-05 DIAGNOSIS — E34.9 HYPOTESTOSTERONEMIA: ICD-10-CM

## 2023-09-05 RX ORDER — TESTOSTERONE CYPIONATE 200 MG/ML
INJECTION, SOLUTION INTRAMUSCULAR
Qty: 2 ML | Refills: 0 | Status: SHIPPED | OUTPATIENT
Start: 2023-09-05 | End: 2023-09-11 | Stop reason: SDUPTHER

## 2023-09-05 NOTE — TELEPHONE ENCOUNTER
Rx Refill Request     Name: Jeronimo HUBER Christensen  :  1975     Specific Pharmacy location:  [facility]    Date of last appointment:  2023   Date of next appointment:  2023   Best number to reach patient:  [phone]

## 2023-09-11 ENCOUNTER — OFFICE VISIT (OUTPATIENT)
Dept: PRIMARY CARE | Facility: CLINIC | Age: 48
End: 2023-09-11
Payer: COMMERCIAL

## 2023-09-11 VITALS
DIASTOLIC BLOOD PRESSURE: 72 MMHG | HEIGHT: 66 IN | WEIGHT: 235 LBS | HEART RATE: 85 BPM | BODY MASS INDEX: 37.77 KG/M2 | OXYGEN SATURATION: 96 % | TEMPERATURE: 98.1 F | SYSTOLIC BLOOD PRESSURE: 120 MMHG

## 2023-09-11 DIAGNOSIS — G47.33 OSA (OBSTRUCTIVE SLEEP APNEA): ICD-10-CM

## 2023-09-11 DIAGNOSIS — E66.01 CLASS 2 SEVERE OBESITY DUE TO EXCESS CALORIES WITH SERIOUS COMORBIDITY AND BODY MASS INDEX (BMI) OF 38.0 TO 38.9 IN ADULT (MULTI): ICD-10-CM

## 2023-09-11 DIAGNOSIS — E34.9 HYPOTESTOSTERONEMIA: ICD-10-CM

## 2023-09-11 DIAGNOSIS — F33.0 MILD EPISODE OF RECURRENT MAJOR DEPRESSIVE DISORDER (CMS-HCC): Primary | ICD-10-CM

## 2023-09-11 DIAGNOSIS — R53.82 CHRONIC FATIGUE: ICD-10-CM

## 2023-09-11 PROCEDURE — 3008F BODY MASS INDEX DOCD: CPT | Performed by: FAMILY MEDICINE

## 2023-09-11 PROCEDURE — 1036F TOBACCO NON-USER: CPT | Performed by: FAMILY MEDICINE

## 2023-09-11 PROCEDURE — 90686 IIV4 VACC NO PRSV 0.5 ML IM: CPT | Performed by: FAMILY MEDICINE

## 2023-09-11 PROCEDURE — 99214 OFFICE O/P EST MOD 30 MIN: CPT | Performed by: FAMILY MEDICINE

## 2023-09-11 PROCEDURE — 90471 IMMUNIZATION ADMIN: CPT | Performed by: FAMILY MEDICINE

## 2023-09-11 RX ORDER — SYRINGE W-NEEDLE,DISPOSAB,3 ML 25GX5/8"
SYRINGE, EMPTY DISPOSABLE MISCELLANEOUS
Qty: 50 EACH | Refills: 1 | Status: SHIPPED | OUTPATIENT
Start: 2023-09-11 | End: 2024-03-04 | Stop reason: SDUPTHER

## 2023-09-11 RX ORDER — TESTOSTERONE CYPIONATE 200 MG/ML
INJECTION, SOLUTION INTRAMUSCULAR
Qty: 2 ML | Refills: 0 | Status: SHIPPED | OUTPATIENT
Start: 2023-09-11 | End: 2023-11-03 | Stop reason: SDUPTHER

## 2023-09-11 ASSESSMENT — ENCOUNTER SYMPTOMS
VOMITING: 0
DIZZINESS: 0
DIARRHEA: 0
MYALGIAS: 0
SORE THROAT: 0
DYSPHORIC MOOD: 0
ADENOPATHY: 0
FATIGUE: 0
CHEST TIGHTNESS: 0
CONSTIPATION: 0
BRUISES/BLEEDS EASILY: 0
HEADACHES: 0
WEAKNESS: 0
ABDOMINAL PAIN: 0
HEMATURIA: 0
NUMBNESS: 0
BLOOD IN STOOL: 0
COUGH: 0
EYE DISCHARGE: 0
DIFFICULTY URINATING: 0
ACTIVITY CHANGE: 0
ARTHRALGIAS: 0
NAUSEA: 0
SHORTNESS OF BREATH: 0
BACK PAIN: 0
NERVOUS/ANXIOUS: 0
NECK PAIN: 0

## 2023-09-11 NOTE — PROGRESS NOTES
Subjective   Patient ID: Jeronimo Christensen is a 48 y.o. male who presents for 3 month follow up on Depression and Hypotestosteronemia.    Continued fatigue and falling asleep in public places.     OARRS:  Ivan Adams MD on 9/11/2023  4:08 PM  I have personally reviewed the OARRS report for Jeronimo Christensen. I have considered the risks of abuse, dependence, addiction and diversion and I believe that it is clinically appropriate for Jeronimo Christensen to be prescribed this medication    Is the patient prescribed a combination of a benzodiazepine and opioid?  No    Last Urine Drug Screen / ordered today: No  No results found for this or any previous visit (from the past 8760 hour(s)).  N/A  Clinical rationale for not completing a Urine Drug Screen: Patient prescribed only an antidiarrheal, anorexiant, or testosterone    Controlled Substance Agreement:  Date of the Last Agreement: 09/11/2023  Reviewed Controlled Substance Agreement including but not limited to the benefits, risks, and alternatives to treatment with a Controlled Substance medication(s).    Testosterone:  What is the patient's goal of therapy? Better function  Is this being achieved with current treatment? yes    I attest the patient does not have: Breast Cancer, Polycythemia, or Prostate Cancer    Last Testosterone check:  Testosterone, Free   Date Value Ref Range Status   06/12/2023 276.5 (H) 35.0 - 155.0 pg/mL Final     Comment:     This test was developed and its analytical performance  characteristics have been determined by [employer] [place], [place]. It has  not been cleared or approved by the U.S. Food and Drug  Administration. This assay has been validated pursuant  to the CLIA regulations and is used for clinical  purposes.     Testosterone, Total, LC-MS/MS   Date Value Ref Range Status   06/12/2023 1202 (H) 250 - 1100 ng/dL Final     Comment:     For additional information, please refer  "to  http://education.[employer].com.BitAccess/faq/  [email or web address]  (This link is being provided for informational/  educational purposes only.)     This test was developed and its analytical performance  characteristics have been determined by [employer] [place], [place]. It has  not been cleared or approved by the U.S. Food and Drug  Administration. This assay has been validated pursuant  to the CLIA regulations and is used for clinical  purposes.       Last CBC:    No results found for: \"CBCDIF\", \"BMCBC\", \"PR1\"    Last PSA:   PSA   Date Value Ref Range Status   07/05/2022 0.32 0.00 - 4.00 ng/mL Final     Comment:     The FDA requires that the method used for PSA assay be   reported to the physician. Values obtained with different   assay methods must not be used interchangeably. This test  was performed at [facility] using the Access   Hybritech PSA assay is a two-site immunoenzymatic sandwich   assay. The assay is approved for measurement of   prostate-specific antigen (PSA)in serum and may be used   in conjunction with a digital rectal examination in men   50 years and older as an aid in detection of prostate   cancer.  5-Alpha-reductase inhibitors (e.g. Proscar, Finasteride,   Avodart, Dutasteride and Felicita) for the treatment of BPH   have been shown to lower PSA levels by an average of 50%   after 6 months of treatment.         Activities of Daily Living:   Is your overall impression that this patient is benefiting (symptom reduction outweighs side effects) from testosterone therapy? Yes     1. Physical Functioning: Same  2. Family Relationship: Same  3. Social Relationship: Same  4. Mood: Same  5. Sleep Patterns: Same  6. Overall Function: Same      HPI  Chronic fatigue stable  Medicine somewhat helpful    Low testosterone  Review blood work  Replace testosterone    Depressed mood stable  No suicidal ideation no psychotic or manic symptoms    MASON " "stable  Ongoing fatigue and sleepiness  Recommend repeat sleep study    Obese  Recommend weight loss  Review of Systems   Constitutional:  Negative for activity change and fatigue.   HENT:  Negative for congestion and sore throat.    Eyes:  Negative for discharge.   Respiratory:  Negative for cough, chest tightness and shortness of breath.    Cardiovascular:  Negative for chest pain and leg swelling.   Gastrointestinal:  Negative for abdominal pain, blood in stool, constipation, diarrhea, nausea and vomiting.   Endocrine: Negative for cold intolerance and heat intolerance.   Genitourinary:  Negative for difficulty urinating and hematuria.   Musculoskeletal:  Negative for arthralgias, back pain, gait problem, myalgias and neck pain.   Allergic/Immunologic: Negative for environmental allergies.   Neurological:  Negative for dizziness, syncope, weakness, numbness and headaches.   Hematological:  Negative for adenopathy. Does not bruise/bleed easily.   Psychiatric/Behavioral:  Negative for dysphoric mood. The patient is not nervous/anxious.    All other systems reviewed and are negative.      Objective   /72 (BP Location: Right arm, BP Cuff Size: Large adult)   Pulse 85   Temp 36.7 °C (98.1 °F)   Ht 1.674 m (5' 5.91\")   Wt 107 kg (235 lb)   SpO2 96%   BMI 38.03 kg/m²    Physical Exam  Vitals and nursing note reviewed.   Constitutional:       General: He is not in acute distress.     Appearance: Normal appearance. He is obese.   HENT:      Head: Normocephalic and atraumatic.      Right Ear: Tympanic membrane, ear canal and external ear normal.      Left Ear: Tympanic membrane, ear canal and external ear normal.      Nose: Nose normal.      Mouth/Throat:      Mouth: Mucous membranes are moist.      Pharynx: Oropharynx is clear. No oropharyngeal exudate or posterior oropharyngeal erythema.   Eyes:      Extraocular Movements: Extraocular movements intact.      Conjunctiva/sclera: Conjunctivae normal.      " "Pupils: Pupils are equal, round, and reactive to light.   Cardiovascular:      Rate and Rhythm: Normal rate and regular rhythm.      Pulses: Normal pulses.      Heart sounds: Normal heart sounds. No murmur heard.  Pulmonary:      Effort: Pulmonary effort is normal. No respiratory distress.      Breath sounds: Normal breath sounds. No wheezing or rales.   Abdominal:      General: Abdomen is flat. Bowel sounds are normal. There is no distension.      Palpations: Abdomen is soft. There is no mass.      Tenderness: There is no abdominal tenderness.   Musculoskeletal:         General: No swelling or deformity. Normal range of motion.      Cervical back: Normal range of motion and neck supple.      Right lower leg: No edema.      Left lower leg: No edema.   Lymphadenopathy:      Cervical: No cervical adenopathy.   Skin:     General: Skin is warm and dry.      Capillary Refill: Capillary refill takes less than 2 seconds.      Findings: No lesion or rash.   Neurological:      General: No focal deficit present.      Mental Status: He is alert and oriented to person, place, and time.      Cranial Nerves: No cranial nerve deficit.      Motor: No weakness.   Psychiatric:         Mood and Affect: Mood normal.         Behavior: Behavior normal.         Thought Content: Thought content normal.         Judgment: Judgment normal.         Assessment/Plan   Problem List Items Addressed This Visit       Mild episode of recurrent major depressive disorder (CMS/HCC) - Primary    Chronic fatigue    Hypotestosteronemia    Relevant Medications    testosterone cypionate (Depo-Testosterone) 200 mg/mL injection    syringe with needle 3 mL 22 x 1 1/2\" syringe    Other Relevant Orders    Follow Up In [facility] - PCP    MASON (obstructive sleep apnea)    Relevant Orders    Home sleep apnea test (HSAT)     Other Visit Diagnoses       Class 2 severe obesity due to excess calories with serious comorbidity and body mass index (BMI) of 38.0 " to 38.9 in adult (CMS/[facility])                Patient education provided.  Stay current with age appropriate health maintenance as instructed.  Appointment here or ER with new or worsening symptoms'  Keep appropriate follow-up visit.  Stay current with proper immunizations   Report suicidal ideation report psychotic or manic symptoms  Refill and testing as above  3-month recheck  Stay current with [facility]SA

## 2023-10-02 DIAGNOSIS — G47.33 OSA (OBSTRUCTIVE SLEEP APNEA): ICD-10-CM

## 2023-10-05 DIAGNOSIS — G47.33 OSA (OBSTRUCTIVE SLEEP APNEA): ICD-10-CM

## 2023-10-27 ENCOUNTER — CLINICAL SUPPORT (OUTPATIENT)
Dept: SLEEP MEDICINE | Facility: HOSPITAL | Age: 48
End: 2023-10-27
Payer: COMMERCIAL

## 2023-10-27 VITALS — WEIGHT: 235.89 LBS | HEIGHT: 66 IN | BODY MASS INDEX: 37.91 KG/M2

## 2023-10-27 DIAGNOSIS — G47.33 OSA (OBSTRUCTIVE SLEEP APNEA): ICD-10-CM

## 2023-10-27 PROCEDURE — 95811 POLYSOM 6/>YRS CPAP 4/> PARM: CPT | Performed by: INTERNAL MEDICINE

## 2023-10-27 NOTE — LETTER
November 15, 2023     Jeronimo Carr  [address] [facility] [address]      Dear Mr. Carr:    Below are the results from your recent visit:    Resulted Orders   In-Center Sleep Study (Non-Sleep Provider Only)    Narrative    RECORDTYPE: CPAP  CPT: 12537 PAP PSG (>=6y)  CSN: [id number]  SCHRECDATE: 10/27/2023 19:40:23  LOCATION_CODE: ELYSL  PROCDUR: 459.0 min    Sleep Medicine   at [place] [address]  [phone]    Positive Airway Pressure (PAP) Titration Report    Patient: JERONIMO CARR              MRN: [id number]                 :   1975  Study Date: 10/27/2023                 Height: 167.0 cm              Age: 48  Study Type: CPAP; 18907 PAP PSG (>=6y) Weight: 104.0 kg  BMI: 37.3 Sex: Male  Referring Clinician: MARY NATARAJAN  Neck size: 43.0 cm            ESS:   2224  DIAGNOSIS: Obstructive Sleep Apnea, Adult (G47.33)    CMS: NO  CLINICAL SUMMARY   Indication: Patient referred for Snoring/breathing problems during sleep,   Excessive daytime sleepiness, Unrefreshing sleep, Unusual body movement,   Frequent leg kicks, Difficulty falling asleep, Difficulty staying sleep,   Sleep apnea on CPAP  Past Medical History: Depression, Obesity, Chronic fatigue, Obstructive sleep   apnea   Medications: Celexa  Past Sleep study: History of sleep disordered breathing according to previous   sleep study.    Celexa    TECHNICAL OBSERVATIONS / BEHAVIOR SUMMARY   Patient Jeronimo Carr is [age] years old male present to the sleep lab   for a PAP titration. Patient currently uses CPAP at home. Patient uses a   ResMed Airift P10 nasal pillows size medium with a pressure of 13cm h20.   Patient stated he still fatigue after using CPAP. ResMed AirFit P30i nasal   pillows size medium was used for study. Patient request not to use a   humidifier for study. Supine sleep encouraged. Patient advised to follow up   with ordering physician,     EEG / SLEEP  SUMMARY  The nocturnal sleep study demonstrated acceptable sleep onset and prolonged   REM sleep latency, total sleep time was 428.5 minutes, and the sleep   efficiency was 93.4%. The limited sleep EEG montage demonstrated appropriate   waveforms without epileptiform discharges.    PERIODIC LIMB MOVEMENT SUMMARY  No significant periodic limb movements of sleep were noted during this study.    The periodic limb movement index during sleep was 13.0/hr, with 2.2%   associated with arousals. The periodic limb movement arousal index during   sleep was 0.3/hr.    RESPIRATORY SUMMARY  The study was performed on room air . Positive airway pressure (PAP) was   titrated as shown in the titration table (see table). Snoring was eliminated   at setting of 15 cmH20.   The optimal mask used during the study was the: ResMed AirFit P30i nasal   pillows size medium.    At the therapeutic PAP setting (15 cm), there were 105 minutes of sleep   during which the residual AHI was 1.7 and the SpO2 gaye was 88%.     CO2 analysis: N/A      CARDIAC SUMMARY  The mean heart rate during wake was 71 bpm and during sleep was 66 bpm. The   cardiac rhythm demonstrated normal sinus rhythm.     IMPRESSION     History of sleep apnea based on previous sleep study (HSAT@ [facility] Severe   with KAMI 3% 65.7/Hr).   Successful titration with CPAP at 15 cm H2O. Sleep-disordered breathing,   sleep-related hypoxemia, and snoring were resolved.   No significant periodic limb movements of sleep were noted.   Fragmentation of sleep noted during this study appeared to be due to frequent   limb movements, spontaneous arousals, and frequent respiratory arousals  RECOMMENDATIONS    Consider increase of his CPAP to the following settings: 15 cm H2O with   heated humidification via a ResMed AirFit P30i nasal pillows size medium     General guidelines for conservative and behavioral management of MASON:  â€ƒ1) Consider positional therapy (non-supine sleeping  position).  â€ƒ2) Avoid sedating medications, alcohol, and tobacco, if applicable.  â€ƒ3) Consider counseling concerning body weight reduction under medical   supervision, if applicable.   Adequate sleep hygiene (good sleep habits) should be emphasized.  Review and advise on habits of sleep duration, regularity, timing, and   environment for sleep health.  Safety management: Avoid driving vehicle or operating heavy machinery when   sleepy.  FOLLOW-UP    With ordering clinician for further recommendations and management  The study raw data and document was personally reviewed and electronically   signed by:   Dr. CHRISTIANA BOSWELL MD on 11/3/2023 at 15:54 PM    TECHNICAL SUMMARY  This standard PAP titration polysomnogram was performed as per sleep center   protocol. The following channels were recorded: EEG (F3-M2, F4-M1, C3-M2,   C4-M1, O1-M2, O2-M1), EOG (LOC, HENRY), chin EMG, thermistor airflow, nasal   pressure transducer airflow, PAP flow, thoracic and abdominal effort channels   by respiratory inductive plethysmography, ECG, limb EMG on bilateral anterior   tibialis, [upper limb leads on bilateral flexor digitorum superficialis,]   pulse oximetry, body position, microphone for snoring, and synchronized   audio-video analysis. Patient was continually attended and monitored by a   registered sleep technologist.  The study was reviewed in full to ensure the   accuracy and quality of the data. Scoring of hypopneas was based on current   AASM guidelines except in the case of CMS related guidelines. For AASM   guidelines, hypopneas were scored when there was 10-second decrement in the   flow limitation by 30% associated with either a 3% O2 desaturation or   arousal. For CMS, hypopneas were scored when there was 10-second decrement in   flow limitation by 30% associated with a 4% O2 desaturation. Respiratory   Effort Related Arousals (RERAs) were scored if there were decrements in flow   limitation not meeting hypopnea  criteria and associated with arousal. The   Respiratory Distress Index (RDI) reflects the RERA index + AHI.       ENCOUNTER #: [id number] [facility] #: [id number] SCHEDULE DATE: 10/27/2023   19:40:23 LOCATION CODE: ELYSLPL            Positive Airway Pressure (PAP) Titration Data Report    PATIENT: JARROD CARR    [age]: [age]    STUDY DATE: 10/27/2023  MRN: [id number]                Sex: Male    Recording Tech: Sheldon Piña  : 1975                BMI: 37.3    Scoring Tech: Caryl Angel    SLEEP ARCHITECTURE SUMMARY    Lights Out Time: 21:36 PM                     Lights on Time: 05:14 AM  Total Recording Time:      459.0 min (7.7 hr) Initial sleep latency:            11.0 min  Total Sleep Time:          428.5 min (7.1 hr) Initial REM latency:              159.0 min  Sleep Efficiency:          93.4%              Wake after sleep onset (WASO):    19.5 min    Sleep Stages     Time (minutes)     % Sleep Time  Wake             30.5               -  Stage N1         17.5               4.1  Stage N2         297.0              69.3  Stage N3         26.0               6.1  REM              88.0                20.5    AROUSAL SUMMARY    Arousal Type     NREM     REM      Sleep                   Count    Index    Count    Index    Count    Index  Total            59       10.4     2        1.4      61       8.5  Spontaneous      41       7.2      2        1.4      43       6.0  Respiratory      16       2.8      0        0.0      16       2.2  Limb Movement    2        0.4      0        0.0      2.0      0.3    LIMB MOVEMENT SUMMARY                           NREM   REM    Sleep  Wake                         Count  Index  Count  Index  Count  Index  Count  Index  Total LM               19     3.3    0      0.0    19     2.7    0      0.0  Total LM with arousal  0      0.0    0      0.0    0      0.0    0      0.0  PLMS                   93     16.4   0      0.0    93     13.0   0      0.0  PLM with arousal       2       0.4    0      0.0    2      0.3    0      0.0  % PLM with arousal     2.2    0.0    2.2    -      Heart rate (bpm)          Wake    NREM    REM    All Sleep      Mean Heart Rate       71      66      64     66      Minimum Heart Rate    56      54      52     52      Maximum Heart Rate    92      88      79     88    CARBON DIOXIDE / OXIMETRY MONITORING SUMMARY    SpO2 Summary                       Wake    NREM    REM  Mean %               95.0    95.0    95.0  Time < 90% (min)     0.6     2.1     0.1  Time <= 88% (min)    0.5     1.4     0.1  Time < 85% (min)     0.2     0.0     0.1  Time < 80% (min)     0.2     0.0     0.1    EtCO2 Summary                     Wake    NREM    REM  Mean               0.0     0.0     0.0  High               0       0       0  Low                0       0       0  Time > 55 mmHg     0.0     0.0     0.0  Time > 50 mmHg     0.0     0.0     0.0  %Time > 50 mmHg    0.0     0.0     0.0    TcCO2 Summary                     Wake    NREM    REM  Mean               0.0     0.0     0.0  High               0       0       0  Low                0       0       0  Time > 55 mmHg     0.0     0.0     0.0  Time > 50 mmHg     0.0     0.0     0.0  %Time > 50 mmHg    0.0     0.0     0.0        PAP TITRATION TABLE - Sleep, Disordered Breathing Rates and Oxygen Statistics    Protocol IPAP  EPAP Backup  Rate O2  Volume Total  Sleep Sleep  Efficiency REM  Sleep REM  RDI Supine  Sleep  Supine  RDI SpO2  Mean SpO2  Brody SpO2  <89% RDI  CPAP     6.0        -            0.0        11.0         44%                 0.0        -        10.0          30.0         94         85          0            27.3  CPAP     7.0        -            0.0        21.5         88%                 0.0        -        0.0           0.0          94         91          0            11.2  CPAP     8.0        -            0.0        33.0         87%                 0.0        -        0.0           0.0          94         85          0             16.4  CPAP     9.0        -            0.0        31.0         98%                 0.0        -        0.0           0.0          95         93          0            0.0  CPAP     10.0       -            0.0        27.5         100%                0.0        -        5.5           21.8         94         90          0            6.5  CPAP     11.0       -            0.0        22.0         100%                0.0        -        12.5          52.8         93         86          1            30.0  CPAP     12.0       -            0.0        41.5         99%                 37.5       1.6      0.0           0.0          95         93          0            1.4  CPAP     13.0       -            0.0        75.0         98%                 0.0        -        15.0          4.0          95         92          0            1.6  CPAP     14.0       -            0.0        61.0         99%                 8.5        0.0      61.0          2.0          95         89          0            2.0  CPAP     15.0       -            0.0        105.0        96%                 42.0       1.4      52.5          2.3          96         88          0            1.7    PAP TITRATION TABLE - Disordered Breathing Events    Protocol IPAP  EPAP Backup  Rate O2  Volume Mask                                         Obstructive   Apnea Mixed  Apnea Central  Apnea Obstructive  Hypopnea Central  Hypopnea RERA RDI  CPAP     6.0        -            0.0        RESMED AIRFIT P30i NASAL PILLOWS   SIZE MEDIUM 0                   0            0              5                       0                 0    27.3  CPAP     7.0        -            0.0        RESMED AIRFIT P30i NASAL PILLOWS   SIZE MEDIUM 0                   0            0              4                       0                 0    11.2  CPAP     8.0        -            0.0        RESMED AIRFIT P30i NASAL PILLOWS   SIZE MEDIUM 1                   0            0              8                        0                 0    16.4  CPAP     9.0        -            0.0        RESMED AIRFIT P30i NASAL PILLOWS   SIZE MEDIUM 0                   0            0              0                       0                 0    0.0  CPAP     10.0       -            0.0        RESMED AIRFIT P30i NASAL PILLOWS   SIZE MEDIUM 0                   0            0              3                       0                 0    6.5  CPAP     11.0       -            0.0        RESMED AIRFIT P30i NASAL PILLOWS   SIZE MEDIUM 0                   0            0              11                      0                 0    30.0  CPAP     12.0       -            0.0        RESMED AIRFIT P30i NASAL PILLOWS   SIZE MEDIUM 0                   0            0              1                       0                 0    1.4  CPAP     13.0       -            0.0        RESMED AIRFIT P30i NASAL PILLOWS   SIZE MEDIUM 0                   0            0              2                       0                 0    1.6  CPAP     14.0       -            0.0        RESMED AIRFIT P30i NASAL PILLOWS   SIZE MEDIUM 0                   0            0              2                       0                 0    2.0  CPAP     15.0       -            0.0        RESMED AIRFIT P30i NASAL PILLOWS   SIZE MEDIUM 0                   0            0              3                       0                 0    1.7      HYPNOGRAM      ----------  Report Digitally Signed By:  CHRISTIANA BOSWELL MD (11/3/2023 4:20:14 PM)         If you have any questions or concerns, please don't hesitate to call.         Sincerely,    Ivan Adams MD

## 2023-10-28 ASSESSMENT — SLEEP AND FATIGUE QUESTIONNAIRES
HOW LIKELY ARE YOU TO NOD OFF OR FALL ASLEEP IN A CAR, WHILE STOPPED FOR A FEW MINUTES IN TRAFFIC: MODERATE CHANCE OF DOZING
HOW LIKELY ARE YOU TO NOD OFF OR FALL ASLEEP WHILE LYING DOWN TO REST IN THE AFTERNOON WHEN CIRCUMSTANCES PERMIT: HIGH CHANCE OF DOZING
SITING INACTIVE IN A PUBLIC PLACE LIKE A CLASS ROOM OR A MOVIE THEATER: HIGH CHANCE OF DOZING
ESS-CHAD TOTAL SCORE: 22
HOW LIKELY ARE YOU TO NOD OFF OR FALL ASLEEP WHEN YOU ARE A PASSENGER IN A CAR FOR AN HOUR WITHOUT A BREAK: HIGH CHANCE OF DOZING
HOW LIKELY ARE YOU TO NOD OFF OR FALL ASLEEP WHILE SITTING AND TALKING TO SOMEONE: MODERATE CHANCE OF DOZING
HOW LIKELY ARE YOU TO NOD OFF OR FALL ASLEEP WHILE SITTING AND READING: HIGH CHANCE OF DOZING
HOW LIKELY ARE YOU TO NOD OFF OR FALL ASLEEP WHILE SITTING QUIETLY AFTER LUNCH WITHOUT ALCOHOL: HIGH CHANCE OF DOZING
HOW LIKELY ARE YOU TO NOD OFF OR FALL ASLEEP WHILE WATCHING TV: HIGH CHANCE OF DOZING

## 2023-10-28 NOTE — PROGRESS NOTES
[facility] TECH NOTE:     Patient: Jeronimo Christensen   MRN//AGE: [id number]  1975  48 y.o.   Technologist: Fazal Piña   Room: 405   Service Date: 10/27/2023        Sleep Testing Location: [facility]    [place]: 22    TECHNOLOGIST SLEEP STUDY PROCEDURE NOTE:   This sleep study is being conducted according to the policies and procedures outlined by the AAS accreditation standards.  The sleep study procedure and processes involved during this appointment was explained to the patient/patient’s family, questions were answered. The patient/family verbalized understanding.      The patient is a 48 y.o. year old male scheduled for a CPAP titration with montage of:  Standard CPAP montage . He arrived for his appointment.      The study that was ultimately completed was a CPAP titration with montage of:  Standard CPAP montage  .    The full study Was completed.  Patient questionnaires completed?: yes     Consents signed? yes    Initial Fall Risk Screening:     Jeronimo has not fallen in the last 6 months. his did not result in injury. Jeronimo does not have a fear of falling. He does not need assistance with sitting, standing, or walking. he does not need assistance walking in his home. he does not need assistance in an unfamiliar setting. The patient is notusing an assistive device.     Brief Study observations: Patient currently uses CPAP at home using ResMed Airfit P10 nasal pillows size medium with a pressure of 13cm h20. No humidifier was used per patient request for study. No abnormal behavior noted prior or during study.    Deviation to order/protocol and reason: N/A     If PAP, which was preferred mask/pressure/mode: ResMed Airfit P30i nasal pillows size medium.    Other:None    After the procedure, the patient/family was informed to ensure followup with ordering clinician for testing results.      Technologist: Faazl Piña

## 2023-11-02 ENCOUNTER — PATIENT MESSAGE (OUTPATIENT)
Dept: PRIMARY CARE | Facility: CLINIC | Age: 48
End: 2023-11-02
Payer: COMMERCIAL

## 2023-11-02 DIAGNOSIS — E34.9 HYPOTESTOSTERONEMIA: Primary | ICD-10-CM

## 2023-11-03 RX ORDER — TESTOSTERONE CYPIONATE 200 MG/ML
INJECTION, SOLUTION INTRAMUSCULAR
Qty: 2 ML | Refills: 0 | Status: SHIPPED | OUTPATIENT
Start: 2023-11-03 | End: 2023-12-01 | Stop reason: SDUPTHER

## 2023-11-03 NOTE — TELEPHONE ENCOUNTER
From: Jeronimo Christensen  To: Ivan Adams MD  Sent: 11/2/2023 7:47 PM EDT  Subject: Ran out of refills     I ran out of refills for testosterone. I need another prescription sent over to the my pharmacy. My next appointment isn't until March.

## 2023-11-23 DIAGNOSIS — F32.0 MAJOR DEPRESSIVE DISORDER, SINGLE EPISODE, MILD (CMS-HCC): ICD-10-CM

## 2023-11-24 NOTE — PROGRESS NOTES
Subjective   Patient ID: Jeronimo Christensen is a 48 y.o. male who presents for Follow-up.  Acute Neurological Problem  The patient's primary symptoms include clumsiness and memory loss. The patient's pertinent negatives include no altered mental status, focal sensory loss, focal weakness, loss of balance, near-syncope, slurred speech, syncope, visual change or weakness. This is a recurrent problem. The current episode started more than 1 year ago. The neurological problem developed gradually. The problem has been gradually worsening since onset. There was no focality noted. Associated symptoms include fatigue. Pertinent negatives include no abdominal pain, auditory change, aura, back pain, bladder incontinence, bowel incontinence, chest pain, confusion, diaphoresis, dizziness, fever, headaches, light-headedness, nausea, neck pain, palpitations, shortness of breath, vertigo or vomiting. Past treatments include acetaminophen, aspirin, bed rest, drinking, eating and sleep. The treatment provided mild relief.   L hand pain  Rhd  No trauma or injury    testosterone replacement  This seems to be helpful    Depressed mood stable  No suicidal ideation no psychotic or manic symptoms    Review of Systems   Constitutional:  Positive for fatigue. Negative for activity change, diaphoresis and fever.   HENT:  Negative for congestion and sore throat.    Eyes:  Negative for discharge.   Respiratory:  Negative for cough, chest tightness and shortness of breath.    Cardiovascular:  Negative for chest pain, palpitations, leg swelling and near-syncope.   Gastrointestinal:  Negative for abdominal pain, blood in stool, bowel incontinence, constipation, diarrhea, nausea and vomiting.   Endocrine: Negative for cold intolerance and heat intolerance.   Genitourinary:  Negative for bladder incontinence, difficulty urinating and hematuria.   Musculoskeletal:  Negative for arthralgias, back pain, gait problem, myalgias and neck pain.  "  Allergic/Immunologic: Negative for environmental allergies.   Neurological:  Negative for dizziness, vertigo, focal weakness, syncope, weakness, light-headedness, numbness, headaches and loss of balance.   Hematological:  Negative for adenopathy. Does not bruise/bleed easily.   Psychiatric/Behavioral:  Positive for memory loss. Negative for confusion and dysphoric mood. The patient is not nervous/anxious.    All other systems reviewed and are negative.      Objective   /78 (BP Location: Right arm, BP Cuff Size: Large adult)   Pulse 85   Temp 36.2 °C (97.2 °F) (Temporal)   Ht 1.677 m (5' 6.02\")   Wt 110 kg (243 lb 3.2 oz)   SpO2 98%   BMI 39.23 kg/m²    Physical Exam  Vitals and nursing note reviewed.   Constitutional:       General: He is not in acute distress.     Appearance: Normal appearance.   HENT:      Head: Normocephalic and atraumatic.      Right Ear: Tympanic membrane, ear canal and external ear normal.      Left Ear: Tympanic membrane, ear canal and external ear normal.      Nose: Nose normal.      Mouth/Throat:      Mouth: Mucous membranes are moist.      Pharynx: Oropharynx is clear. No oropharyngeal exudate or posterior oropharyngeal erythema.   Eyes:      Extraocular Movements: Extraocular movements intact.      Conjunctiva/sclera: Conjunctivae normal.      Pupils: Pupils are equal, round, and reactive to light.   Cardiovascular:      Rate and Rhythm: Normal rate and regular rhythm.      Pulses: Normal pulses.      Heart sounds: Normal heart sounds. No murmur heard.  Pulmonary:      Effort: Pulmonary effort is normal. No respiratory distress.      Breath sounds: Normal breath sounds. No wheezing or rales.   Abdominal:      General: Abdomen is flat. Bowel sounds are normal. There is no distension.      Palpations: Abdomen is soft. There is no mass.      Tenderness: There is no abdominal tenderness.   Musculoskeletal:         General: No swelling or deformity. Normal range of motion.      " Cervical back: Normal range of motion and neck supple.      Right lower leg: No edema.      Left lower leg: No edema.   Lymphadenopathy:      Cervical: No cervical adenopathy.   Skin:     General: Skin is warm and dry.      Capillary Refill: Capillary refill takes less than 2 seconds.      Findings: No lesion or rash.   Neurological:      General: No focal deficit present.      Mental Status: He is alert and oriented to person, place, and time.      Cranial Nerves: No cranial nerve deficit.      Motor: No weakness.   Psychiatric:         Mood and Affect: Mood normal.         Behavior: Behavior normal.         Thought Content: Thought content normal.         Judgment: Judgment normal.         Assessment/Plan   Problem List Items Addressed This Visit       Mild episode of recurrent major depressive disorder (CMS/HCC) - Primary    Relevant Orders    Follow Up In [facility] - PCP    Chronic fatigue    Relevant Orders    Referral to Neurology    Hypotestosteronemia    Relevant Medications    testosterone cypionate (Depo-Testosterone) 200 mg/mL injection    MASON (obstructive sleep apnea)    Relevant Orders    Referral to Neurology     Other Visit Diagnoses       Encephalopathy        Relevant Orders    Referral to Neurology            Patient education provided.  Stay current with age appropriate health maintenance as instructed.  Appointment here or ER with new or worsening symptoms'  Keep appropriate follow-up visit.  Stay current with proper immunizations   Refer to neurology  ER if worsening  Discussed at length with patient    Report suicidal ideation report psychotic or manic symptoms

## 2023-11-24 NOTE — TELEPHONE ENCOUNTER
Recent Visits  Date Type Provider Dept   09/11/23 Office Visit Ivan Adams MD Do [id number] [facility]   06/05/23 Office Visit Ivan Adams MD Do [id number]   Showing recent visits within past 540 days and meeting all other requirements  Future Appointments  Date Type Provider Dept   12/01/23 Appointment Ivan Adams MD Do [id number]   03/04/24 Appointment Ivan Adams MD Do [id number] [facility]   Showing future appointments within next 180 days and meeting all other requirements

## 2023-11-30 RX ORDER — CITALOPRAM 10 MG/1
10 TABLET ORAL DAILY
Qty: 90 TABLET | Refills: 1 | Status: SHIPPED | OUTPATIENT
Start: 2023-11-30 | End: 2024-05-28

## 2023-12-01 ENCOUNTER — OFFICE VISIT (OUTPATIENT)
Dept: PRIMARY CARE | Facility: CLINIC | Age: 48
End: 2023-12-01
Payer: COMMERCIAL

## 2023-12-01 VITALS
OXYGEN SATURATION: 98 % | BODY MASS INDEX: 39.08 KG/M2 | HEIGHT: 66 IN | WEIGHT: 243.2 LBS | SYSTOLIC BLOOD PRESSURE: 120 MMHG | DIASTOLIC BLOOD PRESSURE: 78 MMHG | HEART RATE: 85 BPM | TEMPERATURE: 97.2 F

## 2023-12-01 DIAGNOSIS — E34.9 HYPOTESTOSTERONEMIA: ICD-10-CM

## 2023-12-01 DIAGNOSIS — G93.40 ENCEPHALOPATHY: ICD-10-CM

## 2023-12-01 DIAGNOSIS — R53.82 CHRONIC FATIGUE: ICD-10-CM

## 2023-12-01 DIAGNOSIS — F33.0 MILD EPISODE OF RECURRENT MAJOR DEPRESSIVE DISORDER (CMS-HCC): Primary | ICD-10-CM

## 2023-12-01 DIAGNOSIS — G47.33 OSA (OBSTRUCTIVE SLEEP APNEA): ICD-10-CM

## 2023-12-01 PROCEDURE — 99214 OFFICE O/P EST MOD 30 MIN: CPT | Performed by: FAMILY MEDICINE

## 2023-12-01 PROCEDURE — 1036F TOBACCO NON-USER: CPT | Performed by: FAMILY MEDICINE

## 2023-12-01 PROCEDURE — 3008F BODY MASS INDEX DOCD: CPT | Performed by: FAMILY MEDICINE

## 2023-12-01 RX ORDER — TESTOSTERONE CYPIONATE 200 MG/ML
INJECTION, SOLUTION INTRAMUSCULAR
Qty: 2 ML | Refills: 0 | Status: SHIPPED | OUTPATIENT
Start: 2023-12-01 | End: 2023-12-08

## 2023-12-01 ASSESSMENT — ENCOUNTER SYMPTOMS
NUMBNESS: 0
CLUMSINESS: 1
PALPITATIONS: 0
LOSS OF BALANCE: 0
ARTHRALGIAS: 0
ACTIVITY CHANGE: 0
MYALGIAS: 0
CHEST TIGHTNESS: 0
ABDOMINAL PAIN: 0
DYSPHORIC MOOD: 0
HEMATURIA: 0
BLOOD IN STOOL: 0
NEUROLOGIC COMPLAINT: 1
WEAKNESS: 0
BOWEL INCONTINENCE: 0
DIFFICULTY URINATING: 0
CONSTIPATION: 0
SHORTNESS OF BREATH: 0
ADENOPATHY: 0
BACK PAIN: 0
ALTERED MENTAL STATUS: 0
VISUAL CHANGE: 0
HEADACHES: 0
DIZZINESS: 0
EYE DISCHARGE: 0
DIARRHEA: 0
CONFUSION: 0
NERVOUS/ANXIOUS: 0
SLURRED SPEECH: 0
AURA: 0
VOMITING: 0
NAUSEA: 0
DIAPHORESIS: 0
MEMORY LOSS: 1
COUGH: 0
FEVER: 0
VERTIGO: 0
FOCAL WEAKNESS: 0
SORE THROAT: 0
FATIGUE: 1
NECK PAIN: 0
LIGHT-HEADEDNESS: 0
BRUISES/BLEEDS EASILY: 0
NEAR-SYNCOPE: 0
FOCAL SENSORY LOSS: 0

## 2023-12-07 DIAGNOSIS — E34.9 HYPOTESTOSTERONEMIA: ICD-10-CM

## 2023-12-08 RX ORDER — TESTOSTERONE CYPIONATE 200 MG/ML
INJECTION, SOLUTION INTRAMUSCULAR
Qty: 2 ML | Refills: 0 | Status: SHIPPED | OUTPATIENT
Start: 2023-12-08 | End: 2024-02-02

## 2024-01-31 DIAGNOSIS — E34.9 HYPOTESTOSTERONEMIA: ICD-10-CM

## 2024-01-31 NOTE — PROGRESS NOTES
"Subjective   Patient ID: Jeronimo Christensen is a 49 y.o. male who presents for Depression and Follow-up.  HPI  Patient with chronic fatigue  Would like blood work    Low testosterone levels  This could contribute to fatigue  Follow this and replace    Depressed mood could also be a factor  No suicidal ideation no psychotic or manic symptoms    Sleep apnea stable  Recommend treatment  This would also contribute to fatigue potentially    Obese recommend weight loss and diet  Review of Systems   Constitutional:  Positive for fatigue. Negative for activity change.   HENT:  Negative for congestion and sore throat.    Eyes:  Negative for discharge.   Respiratory:  Negative for cough, chest tightness and shortness of breath.    Cardiovascular:  Negative for chest pain and leg swelling.   Gastrointestinal:  Negative for abdominal pain, blood in stool, constipation, diarrhea, nausea and vomiting.   Endocrine: Negative for cold intolerance and heat intolerance.   Genitourinary:  Negative for difficulty urinating and hematuria.   Musculoskeletal:  Negative for arthralgias, back pain, gait problem, myalgias and neck pain.   Allergic/Immunologic: Negative for environmental allergies.   Neurological:  Negative for dizziness, syncope, weakness, numbness and headaches.   Hematological:  Negative for adenopathy. Does not bruise/bleed easily.   Psychiatric/Behavioral:  Negative for dysphoric mood. The patient is not nervous/anxious.    All other systems reviewed and are negative.      Objective   /78 (BP Location: Right arm, BP Cuff Size: Large adult)   Pulse 106   Ht 1.676 m (5' 6\")   Wt 110 kg (242 lb 3.2 oz)   SpO2 96%   BMI 39.09 kg/m²    Physical Exam  Vitals and nursing note reviewed.   Constitutional:       General: He is not in acute distress.     Appearance: Normal appearance. He is obese.   HENT:      Head: Normocephalic and atraumatic.      Right Ear: Tympanic membrane, ear canal and external ear normal.      " Left Ear: Tympanic membrane, ear canal and external ear normal.      Nose: Nose normal.      Mouth/Throat:      Mouth: Mucous membranes are moist.      Pharynx: Oropharynx is clear. No oropharyngeal exudate or posterior oropharyngeal erythema.   Eyes:      Extraocular Movements: Extraocular movements intact.      Conjunctiva/sclera: Conjunctivae normal.      Pupils: Pupils are equal, round, and reactive to light.   Cardiovascular:      Rate and Rhythm: Normal rate and regular rhythm.      Pulses: Normal pulses.      Heart sounds: Normal heart sounds. No murmur heard.  Pulmonary:      Effort: Pulmonary effort is normal. No respiratory distress.      Breath sounds: Normal breath sounds. No wheezing or rales.   Abdominal:      General: Abdomen is flat. Bowel sounds are normal. There is no distension.      Palpations: Abdomen is soft. There is no mass.      Tenderness: There is no abdominal tenderness.   Musculoskeletal:         General: No swelling or deformity. Normal range of motion.      Cervical back: Normal range of motion and neck supple.      Right lower leg: No edema.      Left lower leg: No edema.   Lymphadenopathy:      Cervical: No cervical adenopathy.   Skin:     General: Skin is warm and dry.      Capillary Refill: Capillary refill takes less than 2 seconds.      Findings: No lesion or rash.   Neurological:      General: No focal deficit present.      Mental Status: He is alert and oriented to person, place, and time.      Cranial Nerves: No cranial nerve deficit.      Motor: No weakness.   Psychiatric:         Mood and Affect: Mood normal.         Behavior: Behavior normal.         Thought Content: Thought content normal.         Judgment: Judgment normal.         Assessment/Plan   Problem List Items Addressed This Visit       Low testosterone in male    Mild episode of recurrent major depressive disorder (CMS/HCC)    Chronic fatigue - Primary    Relevant Orders    Follow Up In [facility] - PCP  "   CBC and Auto Differential (Completed)    Comprehensive Metabolic Panel (Completed)    TSH with reflex to Free T4 if abnormal (Completed)    Vitamin B12 (Completed)    Folate (Completed)    Vitamin D 25-Hydroxy,Total (for eval of Vitamin D levels) (Completed)    Follow Up In [facility] - PCP    Hypotestosteronemia    Relevant Medications    testosterone cypionate (Depo-Testosterone) 200 mg/mL injection    syringe with needle 3 mL 22 x 1 1/2\" syringe    Other Relevant Orders    Testosterone,Free and Total    MASON (obstructive sleep apnea)     Other Visit Diagnoses       Class 2 severe obesity due to excess calories with serious comorbidity and body mass index (BMI) of 39.0 to 39.9 in adult (CMS/[facility])                Patient education provided.  Stay current with age appropriate health maintenance as instructed.  Appointment here or ER with new or worsening symptoms'  Keep appropriate follow-up visit.  Stay current with proper immunizations   Weight loss and diet  Report suicidal ideation report psychotic or manic symptoms  Testing as above  3-month recheck  Return sooner with new or worsening symptoms  "

## 2024-02-02 RX ORDER — TESTOSTERONE CYPIONATE 200 MG/ML
INJECTION, SOLUTION INTRAMUSCULAR
Qty: 2 ML | Refills: 0 | Status: SHIPPED | OUTPATIENT
Start: 2024-02-02 | End: 2024-03-04 | Stop reason: SDUPTHER

## 2024-02-02 NOTE — TELEPHONE ENCOUNTER
Recent Visits  Date Type Provider Dept   12/01/23 Office Visit Ivan Adams MD Do [id number]   09/11/23 Office Visit Ivan Adams MD Do [id number]   06/05/23 Office Visit Ivan Adams MD Do [id number]   Showing recent visits within past 540 days and meeting all other requirements  Future Appointments  Date Type Provider Dept   03/04/24 Appointment Ivan Adams MD Do [id number] [facility]   Showing future appointments within next 180 days and meeting all other requirements    CSA DONE- 9/11/23  LF- 12/8/23

## 2024-03-04 ENCOUNTER — LAB (OUTPATIENT)
Dept: LAB | Facility: LAB | Age: 49
End: 2024-03-04
Payer: COMMERCIAL

## 2024-03-04 ENCOUNTER — OFFICE VISIT (OUTPATIENT)
Dept: PRIMARY CARE | Facility: CLINIC | Age: 49
End: 2024-03-04
Payer: COMMERCIAL

## 2024-03-04 VITALS
WEIGHT: 242.2 LBS | OXYGEN SATURATION: 96 % | HEART RATE: 106 BPM | BODY MASS INDEX: 38.92 KG/M2 | DIASTOLIC BLOOD PRESSURE: 78 MMHG | HEIGHT: 66 IN | SYSTOLIC BLOOD PRESSURE: 112 MMHG

## 2024-03-04 DIAGNOSIS — G47.33 OSA (OBSTRUCTIVE SLEEP APNEA): ICD-10-CM

## 2024-03-04 DIAGNOSIS — R79.89 LOW TESTOSTERONE IN MALE: ICD-10-CM

## 2024-03-04 DIAGNOSIS — E66.01 CLASS 2 SEVERE OBESITY DUE TO EXCESS CALORIES WITH SERIOUS COMORBIDITY AND BODY MASS INDEX (BMI) OF 39.0 TO 39.9 IN ADULT (MULTI): ICD-10-CM

## 2024-03-04 DIAGNOSIS — E34.9 HYPOTESTOSTERONEMIA: ICD-10-CM

## 2024-03-04 DIAGNOSIS — R53.82 CHRONIC FATIGUE: ICD-10-CM

## 2024-03-04 DIAGNOSIS — R53.82 CHRONIC FATIGUE: Primary | ICD-10-CM

## 2024-03-04 DIAGNOSIS — F33.0 MILD EPISODE OF RECURRENT MAJOR DEPRESSIVE DISORDER (CMS-HCC): ICD-10-CM

## 2024-03-04 LAB
25(OH)D3 SERPL-MCNC: 16 NG/ML (ref 30–100)
ALBUMIN SERPL BCP-MCNC: 5 G/DL (ref 3.4–5)
ALP SERPL-CCNC: 77 U/L (ref 33–120)
ALT SERPL W P-5'-P-CCNC: 55 U/L (ref 10–52)
ANION GAP SERPL CALC-SCNC: 14 MMOL/L (ref 10–20)
AST SERPL W P-5'-P-CCNC: 25 U/L (ref 9–39)
BASOPHILS # BLD AUTO: 0.05 X10*3/UL (ref 0–0.1)
BASOPHILS NFR BLD AUTO: 0.6 %
BILIRUB SERPL-MCNC: 0.8 MG/DL (ref 0–1.2)
BUN SERPL-MCNC: 19 MG/DL (ref 6–23)
CALCIUM SERPL-MCNC: 9.8 MG/DL (ref 8.6–10.3)
CHLORIDE SERPL-SCNC: 101 MMOL/L (ref 98–107)
CO2 SERPL-SCNC: 28 MMOL/L (ref 21–32)
CREAT SERPL-MCNC: 1.14 MG/DL (ref 0.5–1.3)
EGFRCR SERPLBLD CKD-EPI 2021: 80 ML/MIN/1.73M*2
EOSINOPHIL # BLD AUTO: 0.05 X10*3/UL (ref 0–0.7)
EOSINOPHIL NFR BLD AUTO: 0.6 %
ERYTHROCYTE [DISTWIDTH] IN BLOOD BY AUTOMATED COUNT: 13.8 % (ref 11.5–14.5)
FOLATE SERPL-MCNC: 20.8 NG/ML
GLUCOSE SERPL-MCNC: 77 MG/DL (ref 74–99)
HCT VFR BLD AUTO: 50.2 % (ref 41–52)
HGB BLD-MCNC: 16.9 G/DL (ref 13.5–17.5)
IMM GRANULOCYTES # BLD AUTO: 0.05 X10*3/UL (ref 0–0.7)
IMM GRANULOCYTES NFR BLD AUTO: 0.6 % (ref 0–0.9)
LYMPHOCYTES # BLD AUTO: 1.81 X10*3/UL (ref 1.2–4.8)
LYMPHOCYTES NFR BLD AUTO: 23.1 %
MCH RBC QN AUTO: 31.9 PG (ref 26–34)
MCHC RBC AUTO-ENTMCNC: 33.7 G/DL (ref 32–36)
MCV RBC AUTO: 95 FL (ref 80–100)
MONOCYTES # BLD AUTO: 0.7 X10*3/UL (ref 0.1–1)
MONOCYTES NFR BLD AUTO: 9 %
NEUTROPHILS # BLD AUTO: 5.16 X10*3/UL (ref 1.2–7.7)
NEUTROPHILS NFR BLD AUTO: 66.1 %
NRBC BLD-RTO: 0 /100 WBCS (ref 0–0)
PLATELET # BLD AUTO: 196 X10*3/UL (ref 150–450)
POTASSIUM SERPL-SCNC: 3.9 MMOL/L (ref 3.5–5.3)
PROT SERPL-MCNC: 7.3 G/DL (ref 6.4–8.2)
RBC # BLD AUTO: 5.3 X10*6/UL (ref 4.5–5.9)
SODIUM SERPL-SCNC: 139 MMOL/L (ref 136–145)
TSH SERPL-ACNC: 2.36 MIU/L (ref 0.44–3.98)
VIT B12 SERPL-MCNC: 513 PG/ML (ref 211–911)
WBC # BLD AUTO: 7.8 X10*3/UL (ref 4.4–11.3)

## 2024-03-04 PROCEDURE — 1036F TOBACCO NON-USER: CPT | Performed by: FAMILY MEDICINE

## 2024-03-04 PROCEDURE — 85025 COMPLETE CBC W/AUTO DIFF WBC: CPT

## 2024-03-04 PROCEDURE — 36415 COLL VENOUS BLD VENIPUNCTURE: CPT

## 2024-03-04 PROCEDURE — 82746 ASSAY OF FOLIC ACID SERUM: CPT

## 2024-03-04 PROCEDURE — 84402 ASSAY OF FREE TESTOSTERONE: CPT

## 2024-03-04 PROCEDURE — 80053 COMPREHEN METABOLIC PANEL: CPT

## 2024-03-04 PROCEDURE — 82607 VITAMIN B-12: CPT

## 2024-03-04 PROCEDURE — 82306 VITAMIN D 25 HYDROXY: CPT

## 2024-03-04 PROCEDURE — 84443 ASSAY THYROID STIM HORMONE: CPT

## 2024-03-04 PROCEDURE — 3008F BODY MASS INDEX DOCD: CPT | Performed by: FAMILY MEDICINE

## 2024-03-04 PROCEDURE — 99215 OFFICE O/P EST HI 40 MIN: CPT | Performed by: FAMILY MEDICINE

## 2024-03-04 RX ORDER — SYRINGE W-NEEDLE,DISPOSAB,3 ML 25GX5/8"
SYRINGE, EMPTY DISPOSABLE MISCELLANEOUS
Qty: 50 EACH | Refills: 1 | Status: SHIPPED | OUTPATIENT
Start: 2024-03-04

## 2024-03-04 RX ORDER — TESTOSTERONE CYPIONATE 200 MG/ML
INJECTION, SOLUTION INTRAMUSCULAR
Qty: 2 ML | Refills: 1 | Status: SHIPPED | OUTPATIENT
Start: 2024-03-04 | End: 2024-05-06

## 2024-03-04 ASSESSMENT — ENCOUNTER SYMPTOMS
HEMATURIA: 0
VOMITING: 0
BRUISES/BLEEDS EASILY: 0
DIFFICULTY URINATING: 0
BACK PAIN: 0
COUGH: 0
FATIGUE: 1
WEAKNESS: 0
SORE THROAT: 0
DIZZINESS: 0
NERVOUS/ANXIOUS: 0
ARTHRALGIAS: 0
CONSTIPATION: 0
BLOOD IN STOOL: 0
SHORTNESS OF BREATH: 0
MYALGIAS: 0
NECK PAIN: 0
DYSPHORIC MOOD: 0
ACTIVITY CHANGE: 0
HEADACHES: 0
NUMBNESS: 0
EYE DISCHARGE: 0
ABDOMINAL PAIN: 0
ADENOPATHY: 0
CHEST TIGHTNESS: 0
NAUSEA: 0
DIARRHEA: 0

## 2024-03-10 LAB
TESTOSTERONE FREE (CHAN): 64.5 PG/ML (ref 35–155)
TESTOSTERONE,TOTAL,LC-MS/MS: 336 NG/DL (ref 250–1100)

## 2024-03-14 ENCOUNTER — OFFICE VISIT (OUTPATIENT)
Dept: NEUROLOGY | Facility: CLINIC | Age: 49
End: 2024-03-14
Payer: COMMERCIAL

## 2024-03-14 VITALS
RESPIRATION RATE: 18 BRPM | BODY MASS INDEX: 38.89 KG/M2 | DIASTOLIC BLOOD PRESSURE: 79 MMHG | SYSTOLIC BLOOD PRESSURE: 123 MMHG | WEIGHT: 242 LBS | HEIGHT: 66 IN | TEMPERATURE: 97.1 F | HEART RATE: 80 BPM

## 2024-03-14 DIAGNOSIS — G47.419 SLEEP ATTACK (HHS-HCC): Primary | ICD-10-CM

## 2024-03-14 DIAGNOSIS — R53.82 CHRONIC FATIGUE: ICD-10-CM

## 2024-03-14 DIAGNOSIS — G47.33 OSA (OBSTRUCTIVE SLEEP APNEA): ICD-10-CM

## 2024-03-14 DIAGNOSIS — G93.40 ENCEPHALOPATHY: ICD-10-CM

## 2024-03-14 PROCEDURE — 99203 OFFICE O/P NEW LOW 30 MIN: CPT | Performed by: PSYCHIATRY & NEUROLOGY

## 2024-03-14 PROCEDURE — 1036F TOBACCO NON-USER: CPT | Performed by: PSYCHIATRY & NEUROLOGY

## 2024-03-14 PROCEDURE — 3008F BODY MASS INDEX DOCD: CPT | Performed by: PSYCHIATRY & NEUROLOGY

## 2024-03-14 ASSESSMENT — ENCOUNTER SYMPTOMS
OCCASIONAL FEELINGS OF UNSTEADINESS: 0
DEPRESSION: 0
LOSS OF SENSATION IN FEET: 0

## 2024-03-14 ASSESSMENT — PAIN SCALES - GENERAL: PAINLEVEL: 0-NO PAIN

## 2024-03-14 NOTE — PROGRESS NOTES
"Consulting Physician: Dr. Adams    Chief Complaint: Sleep Attacks    History Of Present Illness  Jeronimo Christensen is a 49 y.o. male presenting with sleep attacks.    The patient has had trouble with falling asleep for the last 30 years.  He describes episodes of falling asleep while at work, watching television, watching a movie, being at a concert.  He does sleep about 7 hours/night.  He uses a CPAP machine for MASON.  He notes that there are times where he is falling asleep and dreams right away.  There are times where he is standing andf falls asleep. He's never had any falls.  He denies any sleep attacks provoked by high emotion.       Past Medical History  Past Medical History:   Diagnosis Date    Sleep apnea, obstructive 2017       Surgical History  No past surgical history on file.    Family History  Family History   Problem Relation Name Age of Onset    Other (DJD) Father Monty Christensen     Hearing loss Father Monty Christensen     Cancer Mother Kimmeghann Christensen     Depression Mother Kimmeghann Christensen     Hearing loss Mother Kimmeghann Christensen     Depression Brother Ashwin         Social History   reports that he has never smoked. He has never been exposed to tobacco smoke. He has never used smokeless tobacco. He reports that he does not currently use alcohol. He reports that he does not use drugs.     Allergies  Cephalexin    Medications    Current Outpatient Medications:     citalopram (CeleXA) 10 mg tablet, TAKE 1 TABLET BY MOUTH EVERY DAY, Disp: 90 tablet, Rfl: 1    syringe with needle 3 mL 22 x 1 1/2\" syringe, USE EVERY TWO WEEKS TO INJECT TESTOSTERONE., Disp: 50 each, Rfl: 1    testosterone cypionate (Depo-Testosterone) 200 mg/mL injection, 1 ml q 2 weeks, Disp: 2 mL, Rfl: 1      Last Recorded Vitals   Temperature 36.2 °C (97.1 °F), temperature source Temporal, resp. rate 18, height 1.676 m (5' 6\"), weight 110 kg (242 lb).    Objective:  Gen: NAD  Neuro:  --HIF: A&O X 3, repetition and naming " "intact  --CN:  PERRLA, EOMI, VFF, no visible facial asymmetry, facial sensation intact, no tongue or palatal deviation, SCM intact  --Motor: Moves all 4 extremities equally; no focal deficits  --Sensory: Intact to light touch, intact to pinprick  --Reflex: 2+ symmetric, toes down  --Cerebellum: FTN and HTS intact  --Gait: Normal, narrow based.  Toe and Heal Walking Intact.  Tandem Intact    Relevant Results  Lab Results   Component Value Date    WBC 7.8 03/04/2024    HGB 16.9 03/04/2024    HCT 50.2 03/04/2024    MCV 95 03/04/2024     03/04/2024       Lab Results   Component Value Date    GLUCOSE 77 03/04/2024    CALCIUM 9.8 03/04/2024     03/04/2024    K 3.9 03/04/2024    CO2 28 03/04/2024     03/04/2024    BUN 19 03/04/2024    CREATININE 1.14 03/04/2024       No results found for: \"HGBA1C\"    Lab Results   Component Value Date    CHOL 170 06/12/2023    CHOL 194 07/05/2022    CHOL 186 06/16/2020     Lab Results   Component Value Date    HDL 40.9 06/12/2023    HDL 39.0 (A) 07/05/2022    HDL 38.5 (A) 06/16/2020     No results found for: \"LDLCALC\"  Lab Results   Component Value Date    TRIG 218 (H) 06/12/2023    TRIG 313 (H) 07/05/2022    TRIG 225 (H) 06/16/2020     No components found for: \"CHOLHDL\"       Assessment:  This is a 49 year old male with MASON presenting for evaluation of sleep attacks.  For close to 30 years, he notes frequent daytime sleepiness and sleep attacks.  He will fall asleep easily in almost any situation.  Ddx includes narcolepsy.      Recommend evaluation by sleep medicine  He will likely need a sleep study with MSLT        David Romero MD  [facility]  Department of Neurology      A copy of this note was sent to the referring provider.    "

## 2024-05-06 DIAGNOSIS — E34.9 HYPOTESTOSTERONEMIA: ICD-10-CM

## 2024-05-06 RX ORDER — TESTOSTERONE CYPIONATE 200 MG/ML
INJECTION, SOLUTION INTRAMUSCULAR
Qty: 2 ML | Refills: 1 | Status: SHIPPED | OUTPATIENT
Start: 2024-05-06

## 2024-05-06 NOTE — TELEPHONE ENCOUNTER
Rx Refill Request     Date of last appointment:  3/4/2024   Date of next appointment:  6/4/2024   Best number to reach patient:  [phone]         Controlled Medication Only    Date of the Last Agreement: 9/11/2023  Date of the Last Fill at pharmacy: 4/4/2024

## 2024-05-26 DIAGNOSIS — F32.0 MAJOR DEPRESSIVE DISORDER, SINGLE EPISODE, MILD (CMS-HCC): ICD-10-CM

## 2024-05-28 RX ORDER — CITALOPRAM 10 MG/1
10 TABLET ORAL DAILY
Qty: 90 TABLET | Refills: 1 | Status: SHIPPED | OUTPATIENT
Start: 2024-05-28

## 2024-05-28 NOTE — TELEPHONE ENCOUNTER
Rx Refill Request     Name: Jeronimo NGO Sanfordpetercande  :  1975   Medication Name:  citalopram (CeleXA) 10 mg tablet   Specific Pharmacy location:  [address]   Date of last appointment:  3/4/2024   Date of next appointment:  2024   Best number to reach patient:  [phone]

## 2024-06-04 ENCOUNTER — APPOINTMENT (OUTPATIENT)
Dept: PRIMARY CARE | Facility: CLINIC | Age: 49
End: 2024-06-04
Payer: COMMERCIAL

## 2024-06-19 NOTE — PROGRESS NOTES
Patient: Jeronimo Christensen  : 1975 AGE: 49 y.o. SEX:male   MRN: [id number]   Provider: DUSTIN Mcleod-CNP     Location [facility]   Service Date: 2024     PCP: Ivan Adams MD   Referred by: David Romero MD          [facility]  New Visit Note      HISTORY OF PRESENT ILLNESS     Jeronimo Christensen is a 49 y.o. male with a h/o MASON, Obesity, and Depression who presents to [facility].    2024 : NPV, referred by neurology- Dr. Romero with concerns of sleep attacks. Patient was dx with MASON about 8 yrs ago and has been on CPAP since. Most recent sleep study in  showed severe MASON.  Currently on CPAP 15 cm H2O with EPR/Flex 3 and AirFit P10 NP size medium mask through [employer]. Patient has been using machine every night. Patient denies machine problems, mask leak, air hunger, aerophagia, dry mouth, skin irritation, and nasal congestion. The following are patient's perceived benefits of PAP: no snoring on PAP, decreased daytime sleepiness and/or fatigue, sleeps faster at bedtime, decreased nocturnal awakening, and better quality of sleep. Despite using PAP every night, he is concerned with his EDS/sleep attacks. Started around age 21, when he remembers falling asleep during basic training for the [employer]. He describes episodes of sleep attacks while at work, watching television, watching a movie, or being at a concert. He does sleep about 6.5- 7 hours/night. He uses a CPAP machine for MASON. He notes that there are times where he is falling asleep and dreams right away. He denies any sudden muscle weakness provoked by high emotion. Reports some sleep paralysis and hypnagogic hallucinations.      SLEEP STUDY HISTORY (personally reviewed raw data such as interpretation report, data sheet, hypnogram, and titration table if available and applicable)  - HSAT 2023-showing severe MASON with KAMI 3%  65.7 KAMI 4% 52.7, SpO2 gaye 75%.  -PAP titration 10/27/2023-successful titration with CPAP at 15 cm H2O in which sleep disordered breathing, sleep-related hypoxemia, and snoring were resolved.  Optimal mask was ResMed AirFit P30 I nasal pillow size medium.       Media Information - Mask - Airfit P10 - Airsense 10 - 12/12/18          SLEEP-WAKE SCHEDULE    Sleep Patterns: He does have a usual bed partner. In terms of the patient's sleep/wake cycle, he generally gets into bed at approximately 10 PM. his latency to sleep onset after lights out is <15 min. During the night, the patient generally awakens 1-2 times nightly. These awakenings are brief in duration. Final wake time on weekend mornings is around 5:15 AM.    Compared to weekdays (work week), the patient's sleep schedule is  similar on the weekends (off work). Usual bed time on the weekend is around 11 PM. Final wake time on weekend mornings is around 7:30 AM.    Breathing during sleep: snoring and witnessed apneas (without PAP)  Behaviors at night: No   Sleep paralysis: Yes - 2x/month  Hypnogogic or hypnopompic hallucinations: Yes   Cataplexy: No     RLS screen: Patient admits having urge to move legs that occurs at rest (sitting, getting into bed, or lying n bed), worse in the evening, and relieved temporarily with movement.   Frequency of RLS symptoms: 1x/week   RLS symptoms occurring in daytime: No   RLS symptoms progressing to arms: No   RLS symptoms affect sleep onset: rarely, 1x/month   RLS symptoms wakes patient up from sleep: No   Current or past treatment for RLS: denies   Exacerbated by cold     Daytime Symptoms:  On awakening patient reports: wake unrefreshed and feels sleepy  Patient report some daytime symptoms including: DAYTIME SYMPTOMS: reports excessive daytime sleepiness sleep inertia irritability during the day difficulty with memory or concentration during the day feeling sleepy when driving falling asleep at work    Sleep  "environment:  Preferred sleep position: side  Room is dark: Yes  Room is quiet: Yes  Room is cool: Yes  Bed comfort: good    SLEEP HABITS  Caffeine consumption: Yes, 2 cups/day  Alcohol consumption: Yes, rarely (occasional)  Smoking: No  Marijuana: No  Sleep aids: denies     WEIGHT: stable    ESS: 21  CARLO: 17      REVIEW OF SYSTEMS     All other systems have been reviewed and are negative.    ALLERGIES     Allergies   Allergen Reactions    Cephalexin Unknown       MEDICATIONS     Current Outpatient Medications   Medication Sig Dispense Refill    citalopram (CeleXA) 10 mg tablet TAKE 1 TABLET BY MOUTH EVERY DAY 90 tablet 1    syringe with needle 3 mL 22 x 1 1/2\" syringe USE EVERY TWO WEEKS TO INJECT TESTOSTERONE. 50 each 1    testosterone cypionate (Depo-Testosterone) 200 mg/mL injection INJECT 1 ML INTRAMUSCULARLY EVERY 2 WEEKS 2 mL 1     No current facility-administered medications for this visit.       PAST HISTORIES     PERTINENT PAST MEDICAL HISTORY: See HPI    PERTINENT PAST SURGICAL HISTORY for Sleep Medicine:  non-contributory    PERTINENT FAMILY HISTORY for Sleep Medicine:  sleep apnea on PAP- mother, father, brothers x 2     PERTINENT SOCIAL HISTORY:  He  reports that he has never smoked. He has never been exposed to tobacco smoke. He has never used smokeless tobacco. He reports that he does not currently use alcohol. He reports that he does not use drugs. 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     Active Problems, Allergy List, Medication List, and PMH/PSH/FH/Social Hx have been reviewed and reconciled in chart. No significant changes unless documented in the pertinent chart section. Updates made when necessary.     PHYSICAL EXAM     VITAL SIGNS: /79   Pulse 86   Resp 18   Ht 1.676 m (5' 6\")   Wt 114 kg (252 lb)   BMI 40.67 kg/m²     CURRENT WEIGHT:   Vitals:    06/27/24 0811   Weight: 114 kg (252 lb)      PREVIOUS WEIGHTS:  Wt Readings from Last 3 Encounters:   06/27/24 114 kg " "(252 lb)   03/14/24 110 kg (242 lb)   03/04/24 110 kg (242 lb 3.2 oz)     Physical Exam  Constitutional: Awake, not in distress  Lungs: Clear to auscultation bilateral, no cough noted  Heart: Regular rate and rhythm  Skin: Warm, no rash  Neuro: No tremors, moves all extremities  Psych: alert and oriented to time, place, and person    HEENT:   Tonsils enlargement grade 1+   Airway comments: narrow lateral walls   Tongue scalloping: slight   Modified Mallampati score - 3    RESULTS/DATA     No results found for: \"IRON\", \"TRANSFERRIN\", \"IRONSAT\", \"TIBC\", \"FERRITIN\"    Bicarbonate   Date Value Ref Range Status   03/04/2024 28 21 - 32 mmol/L Final       ASSESSMENT/PLAN     Mr. Christensen is a 49 y.o. male and He was referred to the [facility] for evaluation of MASON and Hypersomnia    Problem List, Orders, Assessment, Recommendations:    #MASON, severe  - HSAT 9/28/2023-showing severe MASON with KAMI 3% 65.7 KAMI 4% 52.7, SpO2 gaye 75%.  - PAP titration 10/27/2023-successful titration with CPAP at 15 cm H2O in which sleep disordered breathing, sleep-related hypoxemia, and snoring were resolved.  Optimal mask was ResMed AirFit P30 I nasal pillow size medium.  - Retrieved and personally reviewed recent PAP adherence download data today. See HPI.  - excellent compliance to PAP therapy, residual AHI at goal, and good control of MASON symptoms  - continue current setting 15 CWP  - renew PAP supply orders, order placed to [facility] [place] [place]- [facility]  - diet, exercise, and weight loss were emphasized today in clinic, as were non-supine sleep, avoiding alcohol in the late evening, and driving or operating heavy machinery when sleepy. Patient verbalized understanding.     #Hypersomnia  -The patient's symptoms are suggestive of narcolepsy without cataplexy. Other possibilities include idiopathic hypersomnia, among others.    -In order to better assess the underlying disorder, a baseline full night polysomnogram (PSG) with PAP " and a multiple sleep latency test (MSLT) have been ordered.   -Some of the medications the patient is taking may affect the results of this study, such as Celexa. Patient has been instructed to discontinue these medications for two weeks prior to the test. Patient instructed to work with his PCP for a weaning schedule  -The patient has been advised to maintain regular sleep schedule 1-2 weeks prior to the study and to avoid sleep deprivation.    -Patient has been given a sleep diary to document his sleep habits.   -The patient will bring in the sleep diary to the PSG/MSLT for review.   -Further treatment plan will be discussed after reviewing the results of this study.    -The patient also understands not to drive if sleepy given the increased risk of motor vehicle accidents.     #Obesity  BMI Readings from Last 1 Encounters:   06/27/24 40.67 kg/m²     - Encouraged healthy weight loss via diet and exercise  - Weight loss can help in the long term treatment of MASON.  - Defer management to PCP     All of patient's questions were answered. He verbalizes understanding and agreement with my assessment and plan.    Disposition    Return to clinic in 3 months

## 2024-06-26 NOTE — PROGRESS NOTES
"Subjective   Patient ID: Jeronimo Christensen is a 49 y.o. male who presents for Depression.  HPI    Saw a sleep specialist yesterday, and was told he needs to stop his Celexa two weeks prior and wants to know if that is safe     Chronic fatigue stable  Follow blood work and replace    Low testosterone recommend replace and follow blood work    Depressed mood stable  No suicidal ideation no psychotic or manic symptoms    Sleep apnea ongoing getting testing    Obese recommend weight loss and diet    Review of Systems   Constitutional:  Negative for activity change and fatigue.   HENT:  Negative for congestion and sore throat.    Eyes:  Negative for discharge.   Respiratory:  Negative for cough, chest tightness and shortness of breath.    Cardiovascular:  Negative for chest pain and leg swelling.   Gastrointestinal:  Negative for abdominal pain, blood in stool, constipation, diarrhea, nausea and vomiting.   Endocrine: Negative for cold intolerance and heat intolerance.   Genitourinary:  Negative for difficulty urinating and hematuria.   Musculoskeletal:  Negative for arthralgias, back pain, gait problem, myalgias and neck pain.   Allergic/Immunologic: Negative for environmental allergies.   Neurological:  Negative for dizziness, syncope, weakness, numbness and headaches.   Hematological:  Negative for adenopathy. Does not bruise/bleed easily.   Psychiatric/Behavioral:  Negative for dysphoric mood. The patient is not nervous/anxious.    All other systems reviewed and are negative.      Objective   /78 (BP Location: Right arm, BP Cuff Size: Large adult)   Pulse 77   Ht 1.676 m (5' 6\")   Wt 116 kg (254 lb 12.8 oz)   SpO2 96%   BMI 41.13 kg/m²    Physical Exam  Vitals and nursing note reviewed.   Constitutional:       General: He is not in acute distress.     Appearance: Normal appearance. He is obese.   HENT:      Head: Normocephalic and atraumatic.      Right Ear: Tympanic membrane, ear canal and external ear " normal.      Left Ear: Tympanic membrane, ear canal and external ear normal.      Nose: Nose normal.      Mouth/Throat:      Mouth: Mucous membranes are moist.      Pharynx: Oropharynx is clear. No oropharyngeal exudate or posterior oropharyngeal erythema.   Eyes:      Extraocular Movements: Extraocular movements intact.      Conjunctiva/sclera: Conjunctivae normal.      Pupils: Pupils are equal, round, and reactive to light.   Cardiovascular:      Rate and Rhythm: Normal rate and regular rhythm.      Pulses: Normal pulses.      Heart sounds: Normal heart sounds. No murmur heard.  Pulmonary:      Effort: Pulmonary effort is normal. No respiratory distress.      Breath sounds: Normal breath sounds. No wheezing or rales.   Abdominal:      General: Abdomen is flat. Bowel sounds are normal. There is no distension.      Palpations: Abdomen is soft. There is no mass.      Tenderness: There is no abdominal tenderness.   Musculoskeletal:         General: No swelling or deformity. Normal range of motion.      Cervical back: Normal range of motion and neck supple.      Right lower leg: No edema.      Left lower leg: No edema.   Lymphadenopathy:      Cervical: No cervical adenopathy.   Skin:     General: Skin is warm and dry.      Capillary Refill: Capillary refill takes less than 2 seconds.      Findings: No lesion or rash.   Neurological:      General: No focal deficit present.      Mental Status: He is alert and oriented to person, place, and time.      Cranial Nerves: No cranial nerve deficit.      Motor: No weakness.   Psychiatric:         Mood and Affect: Mood normal.         Behavior: Behavior normal.         Thought Content: Thought content normal.         Judgment: Judgment normal.         Assessment/Plan   Problem List Items Addressed This Visit       Low testosterone in male - Primary    Relevant Orders    Follow Up In [facility] - PCP    Mild episode of recurrent major depressive disorder (CMS-HCC)     "Relevant Orders    Follow Up In [facility] - PCP    Chronic fatigue    Hypotestosteronemia    Relevant Medications    syringe with needle 3 mL 22 x 1 1/2\" syringe    testosterone cypionate (Depo-Testosterone) 200 mg/mL injection    MASON (obstructive sleep apnea)    Class 3 severe obesity due to excess calories with serious comorbidity and body mass index (BMI) of 40.0 to 44.9 in adult (Multi)       Patient education provided.  Stay current with age appropriate health maintenance as instructed.  Appointment here or ER with new or worsening symptoms'  Keep appropriate follow-up visit.  Stay current with proper immunizations   Weight loss and diet  6-month recheck  Return sooner with new or worsening symptoms  Keep specialist follow-up as well  Stay current with colon cancer screening  Discussed at length with patient  "

## 2024-06-27 ENCOUNTER — APPOINTMENT (OUTPATIENT)
Dept: SLEEP MEDICINE | Facility: CLINIC | Age: 49
End: 2024-06-27
Payer: COMMERCIAL

## 2024-06-27 VITALS
HEART RATE: 86 BPM | WEIGHT: 252 LBS | BODY MASS INDEX: 40.5 KG/M2 | HEIGHT: 66 IN | SYSTOLIC BLOOD PRESSURE: 131 MMHG | RESPIRATION RATE: 18 BRPM | DIASTOLIC BLOOD PRESSURE: 79 MMHG

## 2024-06-27 DIAGNOSIS — G47.419 SLEEP ATTACK (HHS-HCC): ICD-10-CM

## 2024-06-27 DIAGNOSIS — Z78.9 NONSMOKER: ICD-10-CM

## 2024-06-27 DIAGNOSIS — G47.33 OSA (OBSTRUCTIVE SLEEP APNEA): Primary | ICD-10-CM

## 2024-06-27 DIAGNOSIS — G47.10 HYPERSOMNIA: ICD-10-CM

## 2024-06-27 DIAGNOSIS — E66.01 CLASS 3 SEVERE OBESITY DUE TO EXCESS CALORIES WITH SERIOUS COMORBIDITY AND BODY MASS INDEX (BMI) OF 40.0 TO 44.9 IN ADULT (MULTI): ICD-10-CM

## 2024-06-27 PROBLEM — E66.813 CLASS 3 SEVERE OBESITY DUE TO EXCESS CALORIES WITH SERIOUS COMORBIDITY AND BODY MASS INDEX (BMI) OF 40.0 TO 44.9 IN ADULT: Status: ACTIVE | Noted: 2024-06-27

## 2024-06-27 PROCEDURE — 3008F BODY MASS INDEX DOCD: CPT | Performed by: NURSE PRACTITIONER

## 2024-06-27 PROCEDURE — 99204 OFFICE O/P NEW MOD 45 MIN: CPT | Performed by: NURSE PRACTITIONER

## 2024-06-27 PROCEDURE — 1036F TOBACCO NON-USER: CPT | Performed by: NURSE PRACTITIONER

## 2024-06-27 PROCEDURE — G2211 COMPLEX E/M VISIT ADD ON: HCPCS | Performed by: NURSE PRACTITIONER

## 2024-06-27 ASSESSMENT — SLEEP AND FATIGUE QUESTIONNAIRES
WORRIED_DISTRESSED_DUE_TO_SLEEP: VERY MUCH NOTICEABLE
HOW LIKELY ARE YOU TO NOD OFF OR FALL ASLEEP WHILE SITTING AND READING: HIGH CHANCE OF DOZING
HOW LIKELY ARE YOU TO NOD OFF OR FALL ASLEEP WHILE SITTING QUIETLY AFTER LUNCH WITHOUT ALCOHOL: SLIGHT CHANCE OF DOZING
SITING INACTIVE IN A PUBLIC PLACE LIKE A CLASS ROOM OR A MOVIE THEATER: HIGH CHANCE OF DOZING
SLEEP_PROBLEM_INTERFERES_DAILY_ACTIVITIES: VERY MUCH NOTICEABLE
HOW LIKELY ARE YOU TO NOD OFF OR FALL ASLEEP WHILE WATCHING TV: HIGH CHANCE OF DOZING
SATISFACTION_WITH_CURRENT_SLEEP_PATTERN: SATISFIED
DIFFICULTY_FALLING_ASLEEP: MILD
HOW LIKELY ARE YOU TO NOD OFF OR FALL ASLEEP WHEN YOU ARE A PASSENGER IN A CAR FOR AN HOUR WITHOUT A BREAK: HIGH CHANCE OF DOZING
ESS-CHAD TOTAL SCORE: 21
HOW LIKELY ARE YOU TO NOD OFF OR FALL ASLEEP WHILE LYING DOWN TO REST IN THE AFTERNOON WHEN CIRCUMSTANCES PERMIT: HIGH CHANCE OF DOZING
HOW LIKELY ARE YOU TO NOD OFF OR FALL ASLEEP WHILE SITTING AND TALKING TO SOMEONE: MODERATE CHANCE OF DOZING
WAKING_TOO_EARLY: MILD
SLEEP_PROBLEM_NOTICEABLE_TO_OTHERS: VERY MUCH NOTICEABLE
HOW LIKELY ARE YOU TO NOD OFF OR FALL ASLEEP IN A CAR, WHILE STOPPED FOR A FEW MINUTES IN TRAFFIC: HIGH CHANCE OF DOZING
DIFFICULTY_STAYING_ASLEEP: MILD

## 2024-06-27 ASSESSMENT — ENCOUNTER SYMPTOMS
LOSS OF SENSATION IN FEET: 1
DEPRESSION: 0
OCCASIONAL FEELINGS OF UNSTEADINESS: 1

## 2024-06-27 ASSESSMENT — PATIENT HEALTH QUESTIONNAIRE - PHQ9
SUM OF ALL RESPONSES TO PHQ9 QUESTIONS 1 AND 2: 0
2. FEELING DOWN, DEPRESSED OR HOPELESS: NOT AT ALL
1. LITTLE INTEREST OR PLEASURE IN DOING THINGS: NOT AT ALL

## 2024-06-27 ASSESSMENT — COLUMBIA-SUICIDE SEVERITY RATING SCALE - C-SSRS
2. HAVE YOU ACTUALLY HAD ANY THOUGHTS OF KILLING YOURSELF?: NO
6. HAVE YOU EVER DONE ANYTHING, STARTED TO DO ANYTHING, OR PREPARED TO DO ANYTHING TO END YOUR LIFE?: NO
1. IN THE PAST MONTH, HAVE YOU WISHED YOU WERE DEAD OR WISHED YOU COULD GO TO SLEEP AND NOT WAKE UP?: NO

## 2024-06-27 NOTE — PATIENT INSTRUCTIONS
[facility] Sleep Medicine   [facility]  [address] [address]  [phone]       NAME: Jeronimo Christensen   DATE: 06/27/24    Your Sleep Provider Today: RUSSELL Mcleod  Your Primary Care Physician: Ivan Adams MD       DIAGNOSIS:   1. MASON (obstructive sleep apnea)  Referral to Adult Sleep Medicine      2. Sleep attack (HHS-HCC)  Referral to Adult Sleep Medicine          Thank you for coming to the Sleep Medicine Clinic today! Your sleep medicine provider today was: RUSSELL Mcleod Below is a summary of your treatment plan, other important information, and our contact numbers:      TREATMENT PLAN     - Obtain overnight sleep study, followed by nap testing  - Follow-up in 3 months.  - If not already done, sign up for 'My Chart' and send prescription requests or messages through this    Sleep Testing for narcolepsy: The best and ideal way is to do a baseline overnight sleep study in the sleep  laboratory called polysomnogram (PSG) followed by multiple nap trials in daytime called multiple sleep latency test (MSLT). The PSG ensures a regular night of sleep prior to the MSLT and rules out other sleep disorders such as sleep apnea or periodic limb movement disorder. In some patients who are already on CPAP at home but still have persistently excessive daytime sleepiness, the PSG is done with CPAP on current home settings. On the other hand, the MSLT documents whether the patient falls asleep, how long it takes to fall asleep and how quickly REM sleep follows. The MSLT is done by allowing patient to have 5 nap opportunities with each nap lasting for 20 minutes at 2-hour interval.     Important preparations prior doing PSG-MSLT:    Keep a regular and consistent sleep-wake schedule 2 weeks prior to the study. Avoid having sleep deprivation.   You will be given a sleep diary that you need to fill out 2 weeks prior to testing and bring them  with you to give our technologists at the time of testing. If you did not receive a copy of sleep diary, please call my office to ask for a copy.   Some of the medications you are taking may affect the results of MSLT. If you are currently taking any stimulants, please discontinue these medications at least 3-5 days prior testing. If you are taking anti-depressants, please discontinue them 2 weeks prior testing if feasible and you will need approval and tapering off instructions from your psychiatrist or prescribing provider.   For the overnight in-lab sleep study, please make sure to bring toiletries, a comfy pillow, additional warm blankets, and any nighttime medications (include as-needed inhaler, pain pill, etc) that you may regularly take.   Bring a complete list of your medications with you and please record the last time you took your medications.   Be sure to eat dinner before you arrive as we generally do not provide dinner inside the sleep testing center.   Bring something to do between the nap trials. You will not be able to sleep for 2 hours between naps.   You cannot have caffeine on the day of testing. If you normally take too much caffeine every day, consider get medical advice on how to taper off caffeine weeks prior testing to prevent having symptoms of caffeine withdrawal in the sleep laboratory.   During MSLT, there will be breakfast and lunch served.     IMPORTANT INFORMATION     Call 911 for medical emergencies.  Our offices are generally open from Monday-Friday, 9 am - 5 pm.  If you need to get in touch with me, you may either call me/my team (number is below) or you can use [employer].  If a referral for a test, for CPAP, or for another specialist was made, and you have not heard about scheduling this within a week, please call scheduling at [phone] ([phone]).  If you are unable to make your appointment for clinic or an overnight study, kindly call the office at least 48 hours in advance to  "cancel and reschedule.  If you are on CPAP, please bring your device's card or the device to each clinic appointment.   There are no supporting services by either the sleep doctors or their staff on weekends and Holidays, or after 5 PM on weekdays.     PRESCRIPTIONS     We require 7 days advanced notice for prescription refills. If we do not receive the request in this time, we cannot guarantee that your medication will be refilled in time.      IMPORTANT PHONE NUMBERS     [facility]: [phone]  [employer] (DME): [phone]  [employer] (DME): [phone]  [facility] (DME): [phone]    CONTACTING YOUR SLEEP MEDICINE PROVIDER AND SLEEP TEAM      For issues with your machine or mask interface, please call your DME provider first. [employer] stands for durable medical company. [employer] is the company who provides you the machine and/or PAP supplies / accessories.   To schedule, cancel, or reschedule SLEEP STUDY APPOINTMENTS, please call the Main Phone Line at [phone] - option 3.   To schedule, cancel, or reschedule CLINIC APPOINTMENTS, you can do it in \"[facility]\", call [phone] for [facility] office , [phone] for [place] office to speak with my on site staff, or call the Main Phone Line at [phone] - option 2  For CLINICAL QUESTIONS or MEDICATION REFILLS, please call direct line for Adult Sleep Nurses at [phone].   Lastly, you can also send a message directly to your provider through \"My Chart\", which is the email service through your  Records Account: [email or web address]     Adult Sleep Nurses (Gilma Burden, SCOTT and Jacque Harmon RN):  For clinical questions and refilling prescriptions: [phone]  Email sleep diaries and other documents at: [email or web address]    Office locations for Pooja Hassan NP:    [address]  [place] [address]  [phone]    [address]  [address]  [place] [address]  [phone]      OUR SLEEP TESTING LOCATIONS     Our team will contact you to schedule your sleep study, however, you can contact us as follow:  Main Phone Line (scheduling only): [phone]), option 3    Sleep Testing Locations:   [place] (18 years and older): [address] [place] [address], 2nd floor   [place] (18 years and older): [address] [facility]; 4th floor  After hours line: [phone]   [place] (18 years and older) at [place]: [id number] [place] [address]  After hours line: [phone]   [facility] (Main campus: All ages): [facility], 6th floor. After hours line: [phone]   [place] (5 years and older; younger considered on case-by-case basis): [address] [facility]; [facility], [address]. Scheduling  After hours line: [phone]       Here at [facility], we wish you a restful sleep!    Your sleep medicine provider for this visit was: Pooja Hassan, APRN-CNP

## 2024-06-28 ENCOUNTER — APPOINTMENT (OUTPATIENT)
Dept: PRIMARY CARE | Facility: CLINIC | Age: 49
End: 2024-06-28
Payer: COMMERCIAL

## 2024-06-28 VITALS
BODY MASS INDEX: 40.95 KG/M2 | WEIGHT: 254.8 LBS | DIASTOLIC BLOOD PRESSURE: 78 MMHG | SYSTOLIC BLOOD PRESSURE: 128 MMHG | OXYGEN SATURATION: 96 % | HEIGHT: 66 IN | HEART RATE: 77 BPM

## 2024-06-28 DIAGNOSIS — E66.01 CLASS 3 SEVERE OBESITY DUE TO EXCESS CALORIES WITH SERIOUS COMORBIDITY AND BODY MASS INDEX (BMI) OF 40.0 TO 44.9 IN ADULT (MULTI): ICD-10-CM

## 2024-06-28 DIAGNOSIS — R53.82 CHRONIC FATIGUE: ICD-10-CM

## 2024-06-28 DIAGNOSIS — R79.89 LOW TESTOSTERONE IN MALE: Primary | ICD-10-CM

## 2024-06-28 DIAGNOSIS — F33.0 MILD EPISODE OF RECURRENT MAJOR DEPRESSIVE DISORDER (CMS-HCC): ICD-10-CM

## 2024-06-28 DIAGNOSIS — G47.33 OSA (OBSTRUCTIVE SLEEP APNEA): ICD-10-CM

## 2024-06-28 DIAGNOSIS — E34.9 HYPOTESTOSTERONEMIA: ICD-10-CM

## 2024-06-28 PROCEDURE — 99214 OFFICE O/P EST MOD 30 MIN: CPT | Performed by: FAMILY MEDICINE

## 2024-06-28 PROCEDURE — 3008F BODY MASS INDEX DOCD: CPT | Performed by: FAMILY MEDICINE

## 2024-06-28 RX ORDER — TESTOSTERONE CYPIONATE 200 MG/ML
INJECTION, SOLUTION INTRAMUSCULAR
Qty: 2 ML | Refills: 1 | Status: SHIPPED | OUTPATIENT
Start: 2024-06-28

## 2024-06-28 RX ORDER — SYRINGE W-NEEDLE,DISPOSAB,3 ML 25GX5/8"
SYRINGE, EMPTY DISPOSABLE MISCELLANEOUS
Qty: 50 EACH | Refills: 1 | Status: SHIPPED | OUTPATIENT
Start: 2024-06-28

## 2024-06-28 ASSESSMENT — ENCOUNTER SYMPTOMS
FATIGUE: 0
SHORTNESS OF BREATH: 0
ARTHRALGIAS: 0
DIZZINESS: 0
DIFFICULTY URINATING: 0
BLOOD IN STOOL: 0
DYSPHORIC MOOD: 0
ABDOMINAL PAIN: 0
HEADACHES: 0
EYE DISCHARGE: 0
BRUISES/BLEEDS EASILY: 0
ACTIVITY CHANGE: 0
NAUSEA: 0
CONSTIPATION: 0
WEAKNESS: 0
BACK PAIN: 0
MYALGIAS: 0
VOMITING: 0
COUGH: 0
NERVOUS/ANXIOUS: 0
DIARRHEA: 0
CHEST TIGHTNESS: 0
HEMATURIA: 0
NUMBNESS: 0
ADENOPATHY: 0
NECK PAIN: 0
SORE THROAT: 0

## 2024-07-30 ENCOUNTER — CLINICAL SUPPORT (OUTPATIENT)
Dept: SLEEP MEDICINE | Facility: HOSPITAL | Age: 49
End: 2024-07-30
Payer: COMMERCIAL

## 2024-07-30 DIAGNOSIS — G47.10 HYPERSOMNIA: ICD-10-CM

## 2024-07-30 DIAGNOSIS — G47.33 OBSTRUCTIVE SLEEP APNEA (ADULT) (PEDIATRIC): ICD-10-CM

## 2024-07-30 PROCEDURE — 95811 POLYSOM 6/>YRS CPAP 4/> PARM: CPT | Performed by: STUDENT IN AN ORGANIZED HEALTH CARE EDUCATION/TRAINING PROGRAM

## 2024-07-31 ENCOUNTER — CLINICAL SUPPORT (OUTPATIENT)
Dept: SLEEP MEDICINE | Facility: HOSPITAL | Age: 49
End: 2024-07-31
Payer: COMMERCIAL

## 2024-07-31 VITALS — WEIGHT: 225.09 LBS | HEIGHT: 66 IN | BODY MASS INDEX: 36.17 KG/M2

## 2024-07-31 DIAGNOSIS — G47.10 HYPERSOMNIA: ICD-10-CM

## 2024-07-31 DIAGNOSIS — G47.419 NARCOLEPSY WITHOUT CATAPLEXY (HHS-HCC): ICD-10-CM

## 2024-07-31 DIAGNOSIS — G47.33 OBSTRUCTIVE SLEEP APNEA (ADULT) (PEDIATRIC): ICD-10-CM

## 2024-07-31 LAB
AMPHETAMINES UR QL SCN: NORMAL
BARBITURATES UR QL SCN: NORMAL
BENZODIAZ UR QL SCN: NORMAL
BZE UR QL SCN: NORMAL
CANNABINOIDS UR QL SCN: NORMAL
FENTANYL+NORFENTANYL UR QL SCN: NORMAL
METHADONE UR QL SCN: NORMAL
OPIATES UR QL SCN: NORMAL
OXYCODONE+OXYMORPHONE UR QL SCN: NORMAL
PCP UR QL SCN: NORMAL

## 2024-07-31 PROCEDURE — 80307 DRUG TEST PRSMV CHEM ANLYZR: CPT

## 2024-07-31 PROCEDURE — 95805 MULTIPLE SLEEP LATENCY TEST: CPT | Performed by: STUDENT IN AN ORGANIZED HEALTH CARE EDUCATION/TRAINING PROGRAM

## 2024-07-31 ASSESSMENT — SLEEP AND FATIGUE QUESTIONNAIRES
HOW LIKELY ARE YOU TO NOD OFF OR FALL ASLEEP WHILE SITTING AND READING: HIGH CHANCE OF DOZING
HOW LIKELY ARE YOU TO NOD OFF OR FALL ASLEEP WHILE SITTING QUIETLY AFTER LUNCH WITHOUT ALCOHOL: HIGH CHANCE OF DOZING
HOW LIKELY ARE YOU TO NOD OFF OR FALL ASLEEP WHILE SITTING AND TALKING TO SOMEONE: MODERATE CHANCE OF DOZING
HOW LIKELY ARE YOU TO NOD OFF OR FALL ASLEEP IN A CAR, WHILE STOPPED FOR A FEW MINUTES IN TRAFFIC: SLIGHT CHANCE OF DOZING
SITING INACTIVE IN A PUBLIC PLACE LIKE A CLASS ROOM OR A MOVIE THEATER: HIGH CHANCE OF DOZING
HOW LIKELY ARE YOU TO NOD OFF OR FALL ASLEEP WHEN YOU ARE A PASSENGER IN A CAR FOR AN HOUR WITHOUT A BREAK: HIGH CHANCE OF DOZING
HOW LIKELY ARE YOU TO NOD OFF OR FALL ASLEEP WHILE LYING DOWN TO REST IN THE AFTERNOON WHEN CIRCUMSTANCES PERMIT: HIGH CHANCE OF DOZING
HOW LIKELY ARE YOU TO NOD OFF OR FALL ASLEEP WHILE WATCHING TV: HIGH CHANCE OF DOZING
ESS-CHAD TOTAL SCORE: 21

## 2024-07-31 NOTE — PROGRESS NOTES
[facility] TECH NOTE:     Patient: Jeronimo Christensen   MRN//AGE: [id number]  1975  49 y.o.   Technologist: Caryl Angel   Room: 402   Service Date: 2024        Sleep Testing Location: [facility]    [place]: 21    TECHNOLOGIST SLEEP STUDY PROCEDURE NOTE:   This sleep study is being conducted according to the policies and procedures outlined by the AAS accreditation standards.  The sleep study procedure and processes involved during this appointment was explained to the patient/patient’s family, questions were answered. The patient/family verbalized understanding.      The patient is a 49 y.o. year old male scheduled for aMSLT/MWT with montage of:  MSLT . he arrived for his appointment.      The study that was ultimately completed was aMSLT/MWT with montage of:  MSLT .    The full study Was completed.  Patient questionnaires completed?: yes     Consents signed? yes    Initial Fall Risk Screening:     Jeronimo has not fallen in the last 6 months. his did not result in injury. Jeronimo does not have a fear of falling. He does not need assistance with sitting, standing, or walking. he does not need assistance walking in his home. he does not need assistance in an unfamiliar setting. The patient is notusing an assistive device.     Brief Study observations: Patient having a MSLT following a CPAP the prior night. 5 naps were completed, 2 of which had REM sleep.     Deviation to order/protocol and reason: none      If PAP, which was preferred mask/pressure/mode: Patient used his own mask for study.      Other:None    After the procedure, the patient/family was informed to ensure followup with ordering clinician for testing results.      Technologist: Caryl Angel

## 2024-07-31 NOTE — PROGRESS NOTES
[facility] TECH NOTE:     Patient: Jeronimo Christensen   MRN//AGE: [id number]  1975  49 y.o.   Technologist: No Khan   Room: 1   Service Date: 2024        Sleep Testing Location: [facility]    [place]: 21    TECHNOLOGIST SLEEP STUDY PROCEDURE NOTE:   This sleep study is being conducted according to the policies and procedures outlined by the AAS accreditation standards.  The sleep study procedure and processes involved during this appointment was explained to the patient/patient’s family, questions were answered. The patient/family verbalized understanding.      The patient is a 49 y.o. year old male scheduled for a PSG  with montage of:  Standard PAP .     The study that was ultimately completed was aCPAP titration with montage of:  Standard PAP .    The full study Was completed.  Patient questionnaires completed?: yes     Consents signed? yes    Initial Fall Risk Screening:     Jeronimo has not fallen in the last 6 months.  Jeronimo does not have a fear of falling. He does not need assistance with sitting, standing, or walking. He does not need assistance walking in his home. He does not need assistance in an unfamiliar setting. The patient is notusing an assistive device.     Brief Study observations: Patient is a 49 year old male here for a PSG ran w/PAP ([id number]) which is to be followed by an MSLT.  Patient's pressures were increased by 3cmH2O.  Final pressure [id number].  Frequent PLMS observed during study, along with frequent arousals.  MSLT followed due to severity of patient's symptoms and ESS.     Deviation to order/protocol and reason: None       If PAP, which was preferred mask/pressure/mode: Respironics Nuance Pro Size Small       Other:None    After the procedure, the patient/family was informed to ensure followup with ordering clinician for testing results.      Technologist: JILLIAN Li

## 2024-08-05 NOTE — PROGRESS NOTES
Patient: Jeronimo Christensen  : 1975 AGE: 49 y.o. SEX:male   MRN: [id number]   Provider: DUSTIN Mcleod-CNP     Location [facility]   Service Date: 2024     PCP: Ivan Adams MD   Referred by: No ref. provider found          [facility] Sleep Medicine Clinic  Follow Up Visit Note      HISTORY OF PRESENT ILLNESS     Jeronimo Christensen is a 49 y.o. male with a h/o MASON, Obesity, and Depression who presents to [facility] Sleep Medicine Clinic for follow up.     SLEEP STUDY HISTORY (personally reviewed raw data such as interpretation report, data sheet, hypnogram, and titration table if available and applicable)  - HSAT 2023-showing severe MASON with KAMI 3% 65.7 KAMI 4% 52.7, SpO2 gaye 75%.  -PAP titration 10/27/2023-successful titration with CPAP at 15 cm H2O in which sleep disordered breathing, sleep-related hypoxemia, and snoring were resolved.  Optimal mask was ResMed AirFit P30 I nasal pillow size medium.  - PSG 24 on CPAP- RDI 3% 8.5, RDI 4% 1.4, SpO2 gaye 91% on CPAP 15-18CWP. PLM index of 68.6/hr. Clinical correlation suggested. MSLT performed next day.  - MSLT 24- revealed a mean SL of 6.1 min with 2 SOREMPs consistent with diagnosis of narcolepsy. Recommend working on consistent sufficient sleep duration.        Media Information          SLEEP HISTORY:    24: Here to review MSLT results. ESS 19 today. Sleep schedule 9:45PM-5:15AM on weekdays and 11:15PM to 7AM on weekends. Naps on the weekends for 2 hours which is refreshing. Has been waking 1x/night for prolonged period (2 hours) since restarting Celexa after sleep study completed. Also going through life stressors with his mother being ill and starting with hospice care. ----> start Modafinil 100mg BID PRN     24: NPV, referred by neurology- Dr. Romero with concerns of sleep attacks. Patient was dx with MASON about 8 yrs ago and has been on CPAP since. Most recent sleep study in  2023 showed severe MASON.  Currently on CPAP 15 cm H2O with EPR/Flex 3 and AirFit P10 NP size medium mask through [employer]. Patient has been using machine every night. Patient denies machine problems, mask leak, air hunger, aerophagia, dry mouth, skin irritation, and nasal congestion. The following are patient's perceived benefits of PAP: no snoring on PAP, decreased daytime sleepiness and/or fatigue, sleeps faster at bedtime, decreased nocturnal awakening, and better quality of sleep. Despite using PAP every night, he is concerned with his EDS/sleep attacks. Started around age 21, when he remembers falling asleep during basic training for the [employer]. He describes episodes of sleep attacks while at work, watching television, watching a movie, or being at a concert. He does sleep about 6.5- 7 hours/night. He uses a CPAP machine for MASON. He notes that there are times where he is falling asleep and dreams right away. He denies any sudden muscle weakness provoked by high emotion. Reports some sleep paralysis and hypnagogic hallucinations.      SLEEP-WAKE SCHEDULE    Sleep Patterns: He does have a usual bed partner. In terms of the patient's sleep/wake cycle, he generally gets into bed at approximately 10 PM. his latency to sleep onset after lights out is <15 min. During the night, the patient generally awakens 1-2 times nightly. These awakenings are brief in duration. Final wake time on weekend mornings is around 5:15 AM.    Compared to weekdays (work week), the patient's sleep schedule is  similar on the weekends (off work). Usual bed time on the weekend is around 11 PM. Final wake time on weekend mornings is around 7:30 AM.    Breathing during sleep: snoring and witnessed apneas (without PAP)  Behaviors at night: No   Sleep paralysis: Yes - 2x/month  Hypnogogic or hypnopompic hallucinations: Yes   Cataplexy: No     RLS screen: Patient admits having urge to move legs that occurs at rest (sitting,  "getting into bed, or lying n bed), worse in the evening, and relieved temporarily with movement.   Frequency of RLS symptoms: 1x/week   RLS symptoms occurring in daytime: No   RLS symptoms progressing to arms: No   RLS symptoms affect sleep onset: rarely, 1x/month   RLS symptoms wakes patient up from sleep: No   Current or past treatment for RLS: denies   Exacerbated by cold     Daytime Symptoms:  On awakening patient reports: wake unrefreshed and feels sleepy  Patient report some daytime symptoms including: DAYTIME SYMPTOMS: reports excessive daytime sleepiness sleep inertia irritability during the day difficulty with memory or concentration during the day feeling sleepy when driving falling asleep at work    Sleep environment:  Preferred sleep position: side  Room is dark: Yes  Room is quiet: Yes  Room is cool: Yes  Bed comfort: good    SLEEP HABITS  Caffeine consumption: Yes, 2 cups/day  Alcohol consumption: Yes, rarely (occasional)  Smoking: No  Marijuana: No  Sleep aids: denies     WEIGHT: stable    ESS: 19      REVIEW OF SYSTEMS     All other systems have been reviewed and are negative.    ALLERGIES     Allergies   Allergen Reactions    Cephalexin Unknown       MEDICATIONS     Current Outpatient Medications   Medication Sig Dispense Refill    citalopram (CeleXA) 10 mg tablet TAKE 1 TABLET BY MOUTH EVERY DAY 90 tablet 1    syringe with needle 3 mL 22 x 1 1/2\" syringe USE EVERY TWO WEEKS TO INJECT TESTOSTERONE. 50 each 1    testosterone cypionate (Depo-Testosterone) 200 mg/mL injection INJECT 1 ML INTRAMUSCULARLY EVERY 2 WEEKS 2 mL 1     No current facility-administered medications for this visit.       PAST HISTORIES     PERTINENT PAST MEDICAL HISTORY: See HPI    PERTINENT PAST SURGICAL HISTORY for Sleep Medicine:  non-contributory    PERTINENT FAMILY HISTORY for Sleep Medicine:  sleep apnea on PAP- mother, father, brothers x 2     PERTINENT SOCIAL HISTORY:  He  reports that he has never smoked. He has never " "been exposed to tobacco smoke. He has never used smokeless tobacco. He reports that he does not currently use alcohol. He reports that he does not use drugs. 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     Active Problems, Allergy List, Medication List, and PMH/PSH/FH/Social Hx have been reviewed and reconciled in chart. No significant changes unless documented in the pertinent chart section. Updates made when necessary.     PHYSICAL EXAM     VITAL SIGNS: /81   Pulse 82   Temp 36.1 °C (97 °F) (Temporal)   Resp 18   Ht 1.676 m (5' 6\")   Wt 116 kg (256 lb 12.8 oz)   SpO2 93%   BMI 41.45 kg/m²     CURRENT WEIGHT:   Vitals:    08/06/24 1304   Weight: 116 kg (256 lb 12.8 oz)     PREVIOUS WEIGHTS:  Wt Readings from Last 3 Encounters:   08/06/24 116 kg (256 lb 12.8 oz)   07/31/24 102 kg (225 lb 1.4 oz)   06/28/24 116 kg (254 lb 12.8 oz)     Constitutional: Alert and oriented, cooperative, no obvious distress   HEENT: Non icteric or anemic, EOM WNL bilaterally   Neck: Supple, no JVD, no goiter, no adenopathy, no rigidity     RESULTS/DATA     No results found for: \"IRON\", \"TRANSFERRIN\", \"IRONSAT\", \"TIBC\", \"FERRITIN\"    Bicarbonate   Date Value Ref Range Status   03/04/2024 28 21 - 32 mmol/L Final       ASSESSMENT/PLAN     Mr. Christensen is a 49 y.o. male and He was referred to the [facility] for evaluation of MASON and Narcolepsy     Problem List, Orders, Assessment, Recommendations:    #MASON, severe  - HSAT 9/28/2023-showing severe MASON with KAMI 3% 65.7 KAMI 4% 52.7, SpO2 gaye 75%.  - PAP titration 10/27/2023-successful titration with CPAP at 15 cm H2O in which sleep disordered breathing, sleep-related hypoxemia, and snoring were resolved.  Optimal mask was ResMed AirFit P30 I nasal pillow size medium.  - Retrieved and personally reviewed recent PAP adherence download data today. See HPI.  - excellent compliance to PAP therapy, residual AHI at goal, and good control of " MASON symptoms  - continue current setting 15 CWP  - DME- [facility]  - diet, exercise, and weight loss were emphasized today in clinic, as were non-supine sleep, avoiding alcohol in the late evening, and driving or operating heavy machinery when sleepy. Patient verbalized understanding.     #NARCOLEPSY without cataplexy   -MSLT on 7/31/2024 revealed a mean SL of 6.1 min with 2 SOREMPs.   -Reviewed and discussed recent PSG-MSLT results with patient as well as treatment options. All questions answered. A copy of recent testing was given to the patient   -Start modafinil 100 mg once daily and may take another dose in the afternoon as needed for breakthrough sleepiness. Do not take past 3 PM.  -Discussed side effects of modafinil   -Advised patient to monitor blood pressure once daily and call clinic if BP>160/100 mm Hg.  -OARRS report checked today. No signs of abuse.   -Controlled substance agreement signed today 8/6/24  -Urine drug screen completed 7/31/24- normal  -Discussed safety concerns with the patient including: narcolepsy itself is not deadly, but it may be dangerous if episodes occur during driving, operating machinery, or similar activities.   Possible issues and complications with narcolepsy include difficulty with social activities, injuries and accidents if sleep attacks occur during the activity, and side effects of medications used to treat the disorder.  Supportive management:   Plan naps to control daytime sleepiness and reduce the number of unplanned sudden sleep attacks. Schedule a brief nap (10-15 minutes) in strategic times during the day, if possible.   Inform teachers or work supervisors about the condition.   Maintain a regular sleep-wake schedule and practice good sleep hygiene.   Expose self to bright light in daytime.   Exercise daily in daytime as it can beneficial and STIMULATING.   Avoid alcohol and sedative-hypnotic drugs as these substances may exacerbate sleepiness.   Avoid driving vehicle or  operating heavy machinery when sleepy until daytime sleepiness is well-treated. A person driving while sleepy is 5 times more likely to have an accident. If you feel sleepy, pull over and take a short power nap (sleep for less than 30 minutes). Otherwise, ask somebody to drive you.     #Obesity  BMI Readings from Last 1 Encounters:   08/06/24 41.45 kg/m²     - Encouraged healthy weight loss via diet and exercise  - Weight loss can help in the long term treatment of MASON.  - Defer management to PCP     All of patient's questions were answered. He verbalizes understanding and agreement with my assessment and plan.    Disposition    Return to clinic in 1 months

## 2024-08-06 ENCOUNTER — OFFICE VISIT (OUTPATIENT)
Dept: SLEEP MEDICINE | Facility: CLINIC | Age: 49
End: 2024-08-06
Payer: COMMERCIAL

## 2024-08-06 VITALS
HEART RATE: 82 BPM | BODY MASS INDEX: 41.27 KG/M2 | SYSTOLIC BLOOD PRESSURE: 127 MMHG | RESPIRATION RATE: 18 BRPM | HEIGHT: 66 IN | DIASTOLIC BLOOD PRESSURE: 81 MMHG | WEIGHT: 256.8 LBS | OXYGEN SATURATION: 93 % | TEMPERATURE: 97 F

## 2024-08-06 DIAGNOSIS — G47.33 OSA (OBSTRUCTIVE SLEEP APNEA): Primary | ICD-10-CM

## 2024-08-06 DIAGNOSIS — E66.9 OBESITY (BMI 30-39.9): ICD-10-CM

## 2024-08-06 DIAGNOSIS — G47.419 PRIMARY NARCOLEPSY WITHOUT CATAPLEXY (HHS-HCC): ICD-10-CM

## 2024-08-06 DIAGNOSIS — Z79.899 MEDICATION MANAGEMENT: ICD-10-CM

## 2024-08-06 PROCEDURE — 3008F BODY MASS INDEX DOCD: CPT | Performed by: NURSE PRACTITIONER

## 2024-08-06 PROCEDURE — G2211 COMPLEX E/M VISIT ADD ON: HCPCS | Performed by: NURSE PRACTITIONER

## 2024-08-06 PROCEDURE — 1036F TOBACCO NON-USER: CPT | Performed by: NURSE PRACTITIONER

## 2024-08-06 PROCEDURE — 99214 OFFICE O/P EST MOD 30 MIN: CPT | Performed by: NURSE PRACTITIONER

## 2024-08-06 RX ORDER — MODAFINIL 100 MG/1
100 TABLET ORAL 2 TIMES DAILY PRN
Qty: 60 TABLET | Refills: 0 | Status: SHIPPED | OUTPATIENT
Start: 2024-08-06 | End: 2024-09-05

## 2024-08-06 ASSESSMENT — PATIENT HEALTH QUESTIONNAIRE - PHQ9
7. TROUBLE CONCENTRATING ON THINGS, SUCH AS READING THE NEWSPAPER OR WATCHING TELEVISION: NEARLY EVERY DAY
3. TROUBLE FALLING OR STAYING ASLEEP OR SLEEPING TOO MUCH: NEARLY EVERY DAY
10. IF YOU CHECKED OFF ANY PROBLEMS, HOW DIFFICULT HAVE THESE PROBLEMS MADE IT FOR YOU TO DO YOUR WORK, TAKE CARE OF THINGS AT HOME, OR GET ALONG WITH OTHER PEOPLE: SOMEWHAT DIFFICULT
9. THOUGHTS THAT YOU WOULD BE BETTER OFF DEAD, OR OF HURTING YOURSELF: NOT AT ALL
SUM OF ALL RESPONSES TO PHQ9 QUESTIONS 1 AND 2: 6
4. FEELING TIRED OR HAVING LITTLE ENERGY: NEARLY EVERY DAY
SUM OF ALL RESPONSES TO PHQ QUESTIONS 1-9: 15
8. MOVING OR SPEAKING SO SLOWLY THAT OTHER PEOPLE COULD HAVE NOTICED. OR THE OPPOSITE, BEING SO FIGETY OR RESTLESS THAT YOU HAVE BEEN MOVING AROUND A LOT MORE THAN USUAL: NOT AT ALL
6. FEELING BAD ABOUT YOURSELF - OR THAT YOU ARE A FAILURE OR HAVE LET YOURSELF OR YOUR FAMILY DOWN: NOT AT ALL
1. LITTLE INTEREST OR PLEASURE IN DOING THINGS: NEARLY EVERY DAY
5. POOR APPETITE OR OVEREATING: NOT AT ALL
2. FEELING DOWN, DEPRESSED OR HOPELESS: NEARLY EVERY DAY

## 2024-08-06 ASSESSMENT — SLEEP AND FATIGUE QUESTIONNAIRES
HOW LIKELY ARE YOU TO NOD OFF OR FALL ASLEEP IN A CAR, WHILE STOPPED FOR A FEW MINUTES IN TRAFFIC: SLIGHT CHANCE OF DOZING
HOW LIKELY ARE YOU TO NOD OFF OR FALL ASLEEP WHEN YOU ARE A PASSENGER IN A CAR FOR AN HOUR WITHOUT A BREAK: MODERATE CHANCE OF DOZING
HOW LIKELY ARE YOU TO NOD OFF OR FALL ASLEEP WHILE SITTING AND READING: HIGH CHANCE OF DOZING
HOW LIKELY ARE YOU TO NOD OFF OR FALL ASLEEP WHILE LYING DOWN TO REST IN THE AFTERNOON WHEN CIRCUMSTANCES PERMIT: HIGH CHANCE OF DOZING
HOW LIKELY ARE YOU TO NOD OFF OR FALL ASLEEP WHILE WATCHING TV: HIGH CHANCE OF DOZING
ESS-CHAD TOTAL SCORE: 19
SITING INACTIVE IN A PUBLIC PLACE LIKE A CLASS ROOM OR A MOVIE THEATER: HIGH CHANCE OF DOZING
HOW LIKELY ARE YOU TO NOD OFF OR FALL ASLEEP WHILE SITTING QUIETLY AFTER LUNCH WITHOUT ALCOHOL: MODERATE CHANCE OF DOZING
HOW LIKELY ARE YOU TO NOD OFF OR FALL ASLEEP WHILE SITTING AND TALKING TO SOMEONE: MODERATE CHANCE OF DOZING

## 2024-08-06 ASSESSMENT — COLUMBIA-SUICIDE SEVERITY RATING SCALE - C-SSRS
6. HAVE YOU EVER DONE ANYTHING, STARTED TO DO ANYTHING, OR PREPARED TO DO ANYTHING TO END YOUR LIFE?: NO
2. HAVE YOU ACTUALLY HAD ANY THOUGHTS OF KILLING YOURSELF?: NO
1. IN THE PAST MONTH, HAVE YOU WISHED YOU WERE DEAD OR WISHED YOU COULD GO TO SLEEP AND NOT WAKE UP?: NO

## 2024-08-06 ASSESSMENT — ENCOUNTER SYMPTOMS
LOSS OF SENSATION IN FEET: 0
DEPRESSION: 1
OCCASIONAL FEELINGS OF UNSTEADINESS: 0

## 2024-08-06 NOTE — PATIENT INSTRUCTIONS
[facility] Sleep Medicine  [address] [place] [address]       NAME: Jeronimo Christensen   DATE: 08/06/24    Your Sleep Provider Today: RUSSELL Mcleod  Your Primary Care Physician: Ivan Adams MD       DIAGNOSIS:   1. MASON (obstructive sleep apnea)        2. Primary narcolepsy without cataplexy ([facility])            Thank you for coming to the Sleep Medicine Clinic today! Your sleep medicine provider today was: RUSSELL Mcleod Below is a summary of your treatment plan, other important information, and our contact numbers:      TREATMENT PLAN     - Follow-up in 1 months.  - If not already done, sign up for 'My Chart' and send prescription requests or messages through this    NARCOLEPSY    Narcolepsy is a chronic neurologic disorder characterized by a decreased ability to regulate sleep-wake cycles. It is characterized by severe and persistent daytime sleepiness even despite getting enough sleep at night. The main symptom of narcolepsy is excessive daytime sleepiness that leads to an irrepressible need to sleep and daytime lapses into sleep. Other commonly seen symptoms include     Cataplexy is sudden and brief loss of muscle control and tone when experiencing strong intense emotion like laughter, surprise, or anger.   Hypnagogic hallucinations and hypnopompic hallucinations are vivid dream-like hallucinations occurring while falling asleep and upon waking up respectively, oftentimes frightening.   Sleep paralysis is a feeling of not being able to talk or move for a brief period either when falling asleep or just after waking up. Touching the person usually causes the sleep paralysis to disappear, although it usually just ends on its own.   Disturbed nighttime sleep  Automatic behaviors involve continuing a familiar, routine, or boring task without being fully aware or later remembering doing it. A person is  actually asleep during the activity.   Still, other symptoms include intense fatigue with continual lack of energy, depression, poor concentration and memory, school failure, acting out dreams, or sleep eating.    Although it affects 1 in every 2000 people, it is often misdiagnosed as psychiatric disorder (depression, ADHD, etc), and can lead to reduced sleep quality, reduced quality of life, and delays in treatment. It affects males and females equally and usually develops during adolescence with most people having first symptoms between age 15 to 30.  However, symptoms of narcolepsy can occur at any age. Based on research evidence, narcolepsy is most commonly caused by decreased amount of brain chemicals called Hypocretin (also known as orexin) which, in turn, is due to loss of hypocretin-producing cells in the brain by autoimmune process.     Treatment of narcolepsy involves medications, lifestyle changes, and educating other people.     Medications: One or more medications are usually prescribed to control the symptoms of narcolepsy. The excessive daytime sleepiness is treated with stimulant while cataplexy is treated with anti-depressants.   Lifestyle changes  Try to plan and schedule 2-3 brief naps (~10-15 minutes) in strategic times during the day to control daytime sleepiness and reduce the number of unplanned sleep attacks. Inform teachers or work supervisors about the condition and hopefully, nap accommodations can be fulfilled.   Avoid alcohol and sedative-hypnotic use as these substances may exacerbate sleepiness.   Avoid caffeine in late afternoon or early evening so that sleep at night is not disturbed.   Maintain and follow a strict and regular sleep-wake schedule that ensures adequate sleep.   Practice good sleep hygiene and shift work hygiene, if the latter is applicable  Exposure self to bright light in daytime as bright light can be STIMULATING.  Exercise daily in daytime as it can be beneficial  and STIMULATING. Increase physical activity by getting up and walking around often to reduce urge to sleep. Avoid boring or repetitive tasks.   Avoid driving vehicle or operating heavy machinery when sleepy until daytime sleepiness is well-treated. A person driving while sleepy is 5 times more likely to have an accident. If you feel sleepy, pull over and take a short power nap (sleep for less than 30 minutes). Otherwise, ask somebody to drive you.    Narcolepsy itself is not deadly but it may be dangerous if episodes occur during driving, operating machinery, or similar activities. Possible issues and complications with narcolepsy include difficulty with social activities, injuries and accidents if sleep attacks occur during the activity, and side effects of medications used to treat the disorder.       Modafinil (By mouth)  Modafinil ([id number])    Improves daytime wakefulness in people with uncontrollable sleepiness caused by narcolepsy or sleep apnea. Also used in people who are sleep deprived from working odd hours such as a night shift.     Brand Name(s): Provigil  There may be other brand names for this medicine.    When This Medicine Should Not Be Used:  You should not use this medicine if you have had an allergic reaction to modafinil or other similar medicines (such as armodafinil).    How to Use This Medicine:  Tablet    Your doctor will tell you how much of this medicine to use and how often. Do not use more medicine or use it more often than your doctor tells you to.  If you use this medicine for daytime wakefulness, take it in the morning. If you use it to stay awake during shift work, take the medicine 1 hour before you begin working.  You may take this medicine with or without food.  If you have sleep apnea and use a CPAP machine at night, continue using this machine with the medicine.  This medicine may not work as well if you use it during a time when you are unusually sleepy.  This medicine  comes with patient instructions. Read and follow these instructions carefully. Ask your doctor or pharmacist if you have any questions.    If a dose is missed:    If you miss a dose or forget to use your medicine, use it as soon as you can. If it is almost time for your next dose, wait until then to use the medicine and skip the missed dose. Do not use extra medicine to make up for a missed dose.  If you have missed your dose by more than half a day, skip the missed dose so you will not be kept awake during your normal sleeping hours.    How to Store and Dispose if This Medicine:    Store the medicine in a closed container at room temperature, away from heat, moisture, and direct light.  Ask your pharmacist, doctor, or health caregiver about the best way to dispose of any leftover medicine after you have finished your treatment. You will also need to throw away old medicine after the expiration date has passed.  Keep all medicine away from children and never share your medicine with anyone.    Drugs and Foods to Avoid:  Ask your doctor or pharmacist before using any other medicine, including over-the-counter medicines, vitamins, and herbal products.  Make sure your doctor knows if you are also using dextroamphetamine (AdderallÆ, DexedrineÆ), itraconazole (SporanoxÆ), ketoconazole (NizoralÆ), methylphenidate (RitalinÆ), or rifampin (RifadinÆ, RimactaneÆ). Tell your doctor if you are using blood thinners (such as warfarin, CoumadinÆ), or an MAO inhibitor (such as MarplanÆ, ParnateÆ, NardilÆ, EldeprylÆ).  Your doctor should know if you are also using medicine for depression (such as clomipramine, desipramine, AnafranilÆ, NorpraminÆ), or medicine for seizures (such as carbamazepine, phenobarbital, TegretolÆ).  Talk to your doctor if you are also using cyclosporine (NeoralÆ, SandimmuneÆ), diazepam (ValiumÆ), phenytoin (DilantinÆ), propranolol (InderalÆ), or triazolam (HalcionÆ). Your dosages of these medicines may need  to be adjusted if you use them together with modafinil.  Birth control pills, implants, shots, or an IUD may not work while you are using modafinil. To keep from getting pregnant, use another form of birth control while you are using this medicine and for one month after your last dose. Other forms of birth control include condoms, a diaphragm, or contraceptive foam or jelly.  Do not drink alcohol while you are using this medicine.    Warnings While Using This Medicine:    Make sure your doctor knows if you are pregnant, planning to become pregnant, or breastfeeding, or if you have high blood pressure, kidney disease, liver disease, heart rhythm problems, heart disease, or have recently had chest pain or a heart attack. Tell your doctor if you have a history of mental illness or drug abuse.  This medicine may cause a serious rash or a serious type of allergic reaction called anaphylaxis. Anaphylaxis can be life-threatening and requires immediate medical attention. Stop taking this medicine and call your doctor right away if you have a skin rash, itching, hives, hoarseness, trouble breathing, trouble swallowing, or any swelling of your hands, face, or mouth while you are using this medicine.  Until you know how this medicine affects you, avoid driving, using machines, or doing anything else that requires you to be alert.  This medicine is not for use with occasional sleepiness that has not been diagnosed as caused by narcolepsy, sleep apnea, or shift-work sleep disturbance.  Your doctor will need to check your progress at regular visits while you are using this medicine. Be sure to keep all appointments.  This medicine may be habit-forming. If you feel that the medicine is not working as well, do not use more than your prescribed dose. Call your doctor for instructions.    Possible Side Effects While Using This Medicine:  Call your doctor right away if you notice any of these side effects:    Allergic reaction:  Itching or hives, swelling in your face or hands, swelling or tingling in your mouth or throat, chest tightness, trouble breathing.  Blistering, peeling, red skin rash.  Chest pain.  Fast, slow pounding, or irregular heartbeat.  Feeling unusually agitated, aggressive, confused, or excited.  Fever, chills, cough, sore throat, and body aches.  Mood or mental changes.  Numbness, tingling, or burning pain in your hands, arms, legs, or feet.  Severe nausea, vomiting, or diarrhea.  Skin rash or itching.  Tremors or shaking.  Unusual bleeding, bruising, or weakness.  Unusual thoughts or behavior.    If you notice these less serious side effects, talk with your doctor:    Anxiety, nervousness, trouble sleeping.  Back pain.  Headache, dizziness.  Nausea, diarrhea, upset stomach, loss of appetite.  Runny or stuffy nose, dry mouth.    If you notice other side effects that you think are caused by this medicine, tell your doctor  Call your doctor for medical advice about side effects. You may report side effects to FDA at 6-472-FDA-3035     IMPORTANT INFORMATION     Call 911 for medical emergencies.  Our offices are generally open from Monday-Friday, 9 am - 5 pm.  If you need to get in touch with me, you may either call me/my team (number is below) or you can use [employer].  If a referral for a test, for CPAP, or for another specialist was made, and you have not heard about scheduling this within a week, please call scheduling at [phone] ([phone]).  If you are unable to make your appointment for clinic or an overnight study, kindly call the office at least 48 hours in advance to cancel and reschedule.  If you are on CPAP, please bring your device's card or the device to each clinic appointment.   There are no supporting services by either the sleep doctors or their staff on weekends and Holidays, or after 5 PM on weekdays.     PRESCRIPTIONS     We require 7 days advanced notice for prescription refills. If we do not receive the  "request in this time, we cannot guarantee that your medication will be refilled in time.      IMPORTANT PHONE NUMBERS     [facility]: [phone]  [employer] (DME): [phone]  [employer] (DME): [phone]  [facility] (DME): [phone]    CONTACTING YOUR SLEEP MEDICINE PROVIDER AND SLEEP TEAM      For issues with your machine or mask interface, please call your DME provider first. DME stands for durable medical company. DME is the company who provides you the machine and/or PAP supplies / accessories.   To schedule, cancel, or reschedule SLEEP STUDY APPOINTMENTS, please call the Main Phone Line at [phone] - option 3.   To schedule, cancel, or reschedule CLINIC APPOINTMENTS, you can do it in \"[place]\", call [phone] for [facility] office , [phone] for [place] office to speak with my on site staff, or call the Main Phone Line at [phone] - option 2  For CLINICAL QUESTIONS or MEDICATION REFILLS, please call direct line for Adult Sleep Nurses at [phone].   Lastly, you can also send a message directly to your provider through \"My Chart\", which is the email service through your  Records Account: [email or web address]     Adult Sleep Nurses (Gilma Burden, RN and Jacque Harmon, RN):  For clinical questions and refilling prescriptions: [phone]  Email sleep diaries and other documents at: [email or web address]    Office locations for Pooja Hassan NP:    [address]  [place] [address] [phone]    [facility] [place]  [address]  [place] [address] [phone]      OUR SLEEP TESTING LOCATIONS     Our team will contact you to schedule your sleep study, however, you can contact us as follow:  Main Phone Line (scheduling only): [phone] ([phone]), option 3    Sleep Testing Locations:   [place] (18 years and older): [address] [place] [address], [facility]" floor   [place] (18 years and older): [address] [facility]; 4th floor  After hours line: [phone]   [place] (18 years and older) at [place]: [id number] [facility]  After hours line: [phone]   [facility] at [facility] (Main campus: All ages): [facility], 6th floor. After hours line: [phone]   [place] (5 years and older; younger considered on case-by-case basis): [address] [place] [address]; [employer] [address], [address]. Scheduling  After hours line: [phone]       Here at [facility], we wish you a restful sleep!    Your sleep medicine provider for this visit was: DUSTIN Mcleod-CNP

## 2024-09-02 DIAGNOSIS — E34.9 HYPOTESTOSTERONEMIA: Primary | ICD-10-CM

## 2024-09-03 RX ORDER — TESTOSTERONE CYPIONATE 200 MG/ML
INJECTION, SOLUTION INTRAMUSCULAR
Qty: 2 ML | Refills: 1 | Status: SHIPPED | OUTPATIENT
Start: 2024-09-03

## 2024-09-12 ENCOUNTER — APPOINTMENT (OUTPATIENT)
Dept: SLEEP MEDICINE | Facility: CLINIC | Age: 49
End: 2024-09-12
Payer: COMMERCIAL

## 2024-09-13 NOTE — PROGRESS NOTES
Patient: Jeronimo Chrsitensen  : 1975 AGE: 49 y.o. SEX:male   MRN: [id number]   Provider: DUSTIN Mcleod-CNP     Location [facility]   Service Date: 2024     PCP: Ivan Adams MD   Referred by: No ref. provider found          [facility]  Follow Up Visit Note  Virtual or Telephone Consent  An interactive audio and video telecommunication system which permits real time communications between the patient (at the originating site) and provider (at the distant site) was utilized to provide this telehealth service.   Verbal consent was requested and obtained from Jeronimo Christensen on this date, 24 for a telehealth visit.      HISTORY OF PRESENT ILLNESS     Jeronimo Christensen is a 49 y.o. male with a h/o MASON, Obesity, and Depression who presents to [facility] for follow up.     SLEEP STUDY HISTORY (personally reviewed raw data such as interpretation report, data sheet, hypnogram, and titration table if available and applicable)  - HSAT 2023-showing severe MASON with KAMI 3% 65.7 KAMI 4% 52.7, SpO2 gaye 75%.  -PAP titration 10/27/2023-successful titration with CPAP at 15 cm H2O in which sleep disordered breathing, sleep-related hypoxemia, and snoring were resolved.  Optimal mask was ResMed AirFit P30 I nasal pillow size medium.  - PSG 24 on CPAP- RDI 3% 8.5, RDI 4% 1.4, SpO2 gaye 91% on CPAP 15-18CWP. PLM index of 68.6/hr. Clinical correlation suggested. MSLT performed next day.  - MSLT 24- revealed a mean SL of 6.1 min with 2 SOREMPs consistent with diagnosis of narcolepsy. Recommend working on consistent sufficient sleep duration.         Media Information - P10 Pillow (med) - Airsense 10 - 18          SLEEP HISTORY:    24: Narcolepsy/MASON follow up. Reports 75% improvement since starting modafinil. Takes 1 tab (100mg) at 5:30AM. Denies any significant medication side effects. Some days feels  slightly more anxious but not problem causing. ESS 19 last visit, ESS 1 today.  Now sleeping better. No more prolonged awakenings. Sleep schedule 10PM to 5:15AM. Using CPAP every night, comfortable with current pressure and P10 mask fit.  Denies any bothersome symptoms.  Needs replacement supplies. ----> ordered    8/6/24: Here to review MSLT results. ESS 19 today. Sleep schedule 9:45PM-5:15AM on weekdays and 11:15PM to 7AM on weekends. Naps on the weekends for 2 hours which is refreshing. Has been waking 1x/night for prolonged period (2 hours) since restarting Celexa after sleep study completed. Also going through life stressors with his mother being ill and starting with hospice care. ----> start Modafinil 100mg BID PRN     6/27/24: NPV, referred by neurology- Dr. Romero with concerns of sleep attacks. Patient was dx with MASON about 8 yrs ago and has been on CPAP since. Most recent sleep study in 2023 showed severe MASON.  Currently on CPAP 15 cm H2O with EPR/Flex 3 and AirFit P10 NP size medium mask through [place]. Patient has been using machine every night. Patient denies machine problems, mask leak, air hunger, aerophagia, dry mouth, skin irritation, and nasal congestion. The following are patient's perceived benefits of PAP: no snoring on PAP, decreased daytime sleepiness and/or fatigue, sleeps faster at bedtime, decreased nocturnal awakening, and better quality of sleep. Despite using PAP every night, he is concerned with his EDS/sleep attacks. Started around age 21, when he remembers falling asleep during basic training for the [employer]. He describes episodes of sleep attacks while at work, watching television, watching a movie, or being at a concert. He does sleep about 6.5- 7 hours/night. He uses a CPAP machine for MASON. He notes that there are times where he is falling asleep and dreams right away. He denies any sudden muscle weakness provoked by high emotion. Reports some sleep paralysis  "and hypnagogic hallucinations.      ESS: 1      REVIEW OF SYSTEMS     All other systems have been reviewed and are negative.    ALLERGIES     Allergies   Allergen Reactions    Cephalexin Unknown       MEDICATIONS     Current Outpatient Medications   Medication Sig Dispense Refill    citalopram (CeleXA) 10 mg tablet TAKE 1 TABLET BY MOUTH EVERY DAY 90 tablet 1    syringe with needle 3 mL 22 x 1 1/2\" syringe USE EVERY TWO WEEKS TO INJECT TESTOSTERONE. 50 each 1    testosterone cypionate (Depo-Testosterone) 200 mg/mL injection INJECT 1 ML INTRAMUSCULARLY EVERY 2 WEEKS 2 mL 1    modafinil (Provigil) 100 mg tablet Take 1 tablet (100 mg) by mouth 2 times a day as needed (sleepiess). 60 tablet 0     No current facility-administered medications for this visit.       PAST HISTORIES     PERTINENT PAST MEDICAL HISTORY: See HPI    PERTINENT PAST SURGICAL HISTORY for Sleep Medicine:  non-contributory    PERTINENT FAMILY HISTORY for Sleep Medicine:  sleep apnea on PAP- mother, father, brothers x 2     PERTINENT SOCIAL HISTORY:  He  reports that he has never smoked. He has never been exposed to tobacco smoke. He has never used smokeless tobacco. He reports that he does not currently use alcohol. He reports that he does not use drugs. 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     Active Problems, Allergy List, Medication List, and PMH/PSH/FH/Social Hx have been reviewed and reconciled in chart. No significant changes unless documented in the pertinent chart section. Updates made when necessary.     PHYSICAL EXAM     VITAL SIGNS: There were no vitals taken for this visit.    CURRENT WEIGHT:   There were no vitals filed for this visit.    PREVIOUS WEIGHTS:  Wt Readings from Last 3 Encounters:   08/06/24 116 kg (256 lb 12.8 oz)   07/31/24 102 kg (225 lb 1.4 oz)   06/28/24 116 kg (254 lb 12.8 oz)     Limited telehealth examination  PHYSICAL EXAMINATION  General appearance: awake alert in NAD  Affect: normal  HEENT: Nasal " "congestion absent  Lungs: no cough.  Neuro: normal speech      RESULTS/DATA     No results found for: \"IRON\", \"TRANSFERRIN\", \"IRONSAT\", \"TIBC\", \"FERRITIN\"    Bicarbonate   Date Value Ref Range Status   03/04/2024 28 21 - 32 mmol/L Final       ASSESSMENT/PLAN     Mr. Christensen is a 49 y.o. male and He was referred to the [facility] for evaluation of MASON and Narcolepsy     Problem List, Orders, Assessment, Recommendations:    #MASON, severe  - HSAT 9/28/2023-showing severe MASON with KAMI 3% 65.7 KAMI 4% 52.7, SpO2 gaye 75%.  - PAP titration 10/27/2023-successful titration with CPAP at 15 cm H2O in which sleep disordered breathing, sleep-related hypoxemia, and snoring were resolved.  Optimal mask was ResMed AirFit P30 I nasal pillow size medium.  - Retrieved and personally reviewed recent PAP adherence download data today. See HPI.  - excellent compliance to PAP therapy, residual AHI at goal (2.6), and good control of MASON symptoms  - continue current setting 15 CWP  - Supply renewal order placed to DME- [facility] (P10)  - diet, exercise, and weight loss were emphasized today in clinic, as were non-supine sleep, avoiding alcohol in the late evening, and driving or operating heavy machinery when sleepy. Patient verbalized understanding.     #NARCOLEPSY without cataplexy   -MSLT on 7/31/2024 revealed a mean SL of 6.1 min with 2 SOREMPs.   -Continue modafinil 100 mg once daily and may take another dose in the afternoon as needed for breakthrough sleepiness. Consider taking 50mg @ 5:30 and 50mg at noon. Do not take past 3 PM. Refill sent  -Discussed side effects of modafinil   -Advised patient to monitor blood pressure once daily and call clinic if BP>160/100 mm Hg.  -OARRS report checked today. No signs of abuse.   -Controlled substance agreement signed today 8/6/24  -Urine drug screen completed 7/31/24- normal  -Discussed safety concerns with the patient including: narcolepsy itself is not deadly, " but it may be dangerous if episodes occur during driving, operating machinery, or similar activities.   Possible issues and complications with narcolepsy include difficulty with social activities, injuries and accidents if sleep attacks occur during the activity, and side effects of medications used to treat the disorder.  Supportive management:   Plan naps to control daytime sleepiness and reduce the number of unplanned sudden sleep attacks. Schedule a brief nap (10-15 minutes) in strategic times during the day, if possible.   Inform teachers or work supervisors about the condition.   Maintain a regular sleep-wake schedule and practice good sleep hygiene.   Expose self to bright light in daytime.   Exercise daily in daytime as it can beneficial and STIMULATING.   Avoid alcohol and sedative-hypnotic drugs as these substances may exacerbate sleepiness.   Avoid driving vehicle or operating heavy machinery when sleepy until daytime sleepiness is well-treated. A person driving while sleepy is 5 times more likely to have an accident. If you feel sleepy, pull over and take a short power nap (sleep for less than 30 minutes). Otherwise, ask somebody to drive you.     #Obesity  BMI Readings from Last 1 Encounters:   08/06/24 41.45 kg/m²     - Encouraged healthy weight loss via diet and exercise  - Weight loss can help in the long term treatment of MASON.  - Defer management to PCP     All of patient's questions were answered. He verbalizes understanding and agreement with my assessment and plan.    Disposition    Return to clinic in 3 months       I personally spent 30 minutes today (exclusive of procedures) providing care for this patient, including preparation, face to face time, EMR documentation and other services such as review of medical records, diagnostic results, patient education, counseling, and coordination of care.

## 2024-09-17 ENCOUNTER — APPOINTMENT (OUTPATIENT)
Dept: SLEEP MEDICINE | Facility: CLINIC | Age: 49
End: 2024-09-17
Payer: COMMERCIAL

## 2024-09-17 DIAGNOSIS — E66.01 CLASS 3 SEVERE OBESITY DUE TO EXCESS CALORIES WITH SERIOUS COMORBIDITY AND BODY MASS INDEX (BMI) OF 40.0 TO 44.9 IN ADULT: ICD-10-CM

## 2024-09-17 DIAGNOSIS — G47.33 OSA (OBSTRUCTIVE SLEEP APNEA): Primary | ICD-10-CM

## 2024-09-17 DIAGNOSIS — G47.419 PRIMARY NARCOLEPSY WITHOUT CATAPLEXY (HHS-HCC): ICD-10-CM

## 2024-09-17 PROCEDURE — 99214 OFFICE O/P EST MOD 30 MIN: CPT | Performed by: NURSE PRACTITIONER

## 2024-09-17 PROCEDURE — 1036F TOBACCO NON-USER: CPT | Performed by: NURSE PRACTITIONER

## 2024-09-17 RX ORDER — MODAFINIL 100 MG/1
100 TABLET ORAL 2 TIMES DAILY PRN
Qty: 60 TABLET | Refills: 0 | Status: SHIPPED | OUTPATIENT
Start: 2024-09-17 | End: 2024-10-17

## 2024-09-17 ASSESSMENT — SLEEP AND FATIGUE QUESTIONNAIRES
HOW LIKELY ARE YOU TO NOD OFF OR FALL ASLEEP WHILE SITTING AND TALKING TO SOMEONE: WOULD NEVER DOZE
HOW LIKELY ARE YOU TO NOD OFF OR FALL ASLEEP WHEN YOU ARE A PASSENGER IN A CAR FOR AN HOUR WITHOUT A BREAK: WOULD NEVER DOZE
SITING INACTIVE IN A PUBLIC PLACE LIKE A CLASS ROOM OR A MOVIE THEATER: WOULD NEVER DOZE
HOW LIKELY ARE YOU TO NOD OFF OR FALL ASLEEP IN A CAR, WHILE STOPPED FOR A FEW MINUTES IN TRAFFIC: WOULD NEVER DOZE
HOW LIKELY ARE YOU TO NOD OFF OR FALL ASLEEP IN A CAR, WHILE STOPPED FOR A FEW MINUTES IN TRAFFIC: WOULD NEVER DOZE
SLEEP_PROBLEM_INTERFERES_DAILY_ACTIVITIES: VERY MUCH NOTICEABLE
ESS-CHAD TOTAL SCORE: 1
HOW LIKELY ARE YOU TO NOD OFF OR FALL ASLEEP WHILE SITTING QUIETLY AFTER LUNCH WITHOUT ALCOHOL: WOULD NEVER DOZE
ESS TOTAL SCORE: 4
HOW LIKELY ARE YOU TO NOD OFF OR FALL ASLEEP WHILE SITTING AND READING: WOULD NEVER DOZE
SATISFACTION_WITH_CURRENT_SLEEP_PATTERN: SATISFIED
HOW LIKELY ARE YOU TO NOD OFF OR FALL ASLEEP WHILE LYING DOWN TO REST IN THE AFTERNOON WHEN CIRCUMSTANCES PERMIT: SLIGHT CHANCE OF DOZING
HOW LIKELY ARE YOU TO NOD OFF OR FALL ASLEEP WHILE SITTING INACTIVE IN A PUBLIC PLACE: HIGH CHANCE OF DOZING
HOW LIKELY ARE YOU TO NOD OFF OR FALL ASLEEP WHILE WATCHING TV: WOULD NEVER DOZE
WORRIED_DISTRESSED_DUE_TO_SLEEP: VERY MUCH NOTICEABLE
SLEEP_PROBLEM_NOTICEABLE_TO_OTHERS: VERY MUCH NOTICEABLE

## 2024-09-17 ASSESSMENT — PATIENT HEALTH QUESTIONNAIRE - PHQ9
1. LITTLE INTEREST OR PLEASURE IN DOING THINGS: NOT AT ALL
2. FEELING DOWN, DEPRESSED OR HOPELESS: SEVERAL DAYS
SUM OF ALL RESPONSES TO PHQ9 QUESTIONS 1 AND 2: 1
10. IF YOU CHECKED OFF ANY PROBLEMS, HOW DIFFICULT HAVE THESE PROBLEMS MADE IT FOR YOU TO DO YOUR WORK, TAKE CARE OF THINGS AT HOME, OR GET ALONG WITH OTHER PEOPLE: NOT DIFFICULT AT ALL

## 2024-09-17 ASSESSMENT — ENCOUNTER SYMPTOMS
LOSS OF SENSATION IN FEET: 0
OCCASIONAL FEELINGS OF UNSTEADINESS: 0
DEPRESSION: 0

## 2024-09-17 ASSESSMENT — COLUMBIA-SUICIDE SEVERITY RATING SCALE - C-SSRS
6. HAVE YOU EVER DONE ANYTHING, STARTED TO DO ANYTHING, OR PREPARED TO DO ANYTHING TO END YOUR LIFE?: NO
1. IN THE PAST MONTH, HAVE YOU WISHED YOU WERE DEAD OR WISHED YOU COULD GO TO SLEEP AND NOT WAKE UP?: NO
2. HAVE YOU ACTUALLY HAD ANY THOUGHTS OF KILLING YOURSELF?: NO

## 2024-09-24 NOTE — PROGRESS NOTES
"Subjective   Patient ID: Jeronimo Christensen is a 49 y.o. male who presents for Med Management (CSA for testosterone/).      HPI    No complaints     Hypotestosteronemia stable needs refill    Chronic fatigue with some improvement with treatment    Depressed mood stable  No suicidal ideation no psychotic or manic symptoms    Obese recommend weight loss and diet    Narcolepsy stable    Stay current with urine drug screen and CSA          Review of Systems   Constitutional:  Negative for activity change and fatigue.   HENT:  Negative for congestion and sore throat.    Eyes:  Negative for discharge.   Respiratory:  Negative for cough, chest tightness and shortness of breath.    Cardiovascular:  Negative for chest pain and leg swelling.   Gastrointestinal:  Negative for abdominal pain, blood in stool, constipation, diarrhea, nausea and vomiting.   Endocrine: Negative for cold intolerance and heat intolerance.   Genitourinary:  Negative for difficulty urinating and hematuria.   Musculoskeletal:  Negative for arthralgias, back pain, gait problem, myalgias and neck pain.   Allergic/Immunologic: Negative for environmental allergies.   Neurological:  Negative for dizziness, syncope, weakness, numbness and headaches.   Hematological:  Negative for adenopathy. Does not bruise/bleed easily.   Psychiatric/Behavioral:  Negative for dysphoric mood. The patient is not nervous/anxious.    All other systems reviewed and are negative.      Objective   /72   Pulse 53   Temp 36.1 °C (96.9 °F)   Resp 16   Ht 1.676 m (5' 6\")   Wt 113 kg (250 lb)   SpO2 96%   BMI 40.35 kg/m²    Physical Exam  Vitals and nursing note reviewed.   Constitutional:       General: He is not in acute distress.     Appearance: Normal appearance. He is obese.   HENT:      Head: Normocephalic and atraumatic.      Right Ear: Tympanic membrane, ear canal and external ear normal.      Left Ear: Tympanic membrane, ear canal and external ear normal.      Nose: " Nose normal.      Mouth/Throat:      Mouth: Mucous membranes are moist.      Pharynx: Oropharynx is clear. No oropharyngeal exudate or posterior oropharyngeal erythema.   Eyes:      Extraocular Movements: Extraocular movements intact.      Conjunctiva/sclera: Conjunctivae normal.      Pupils: Pupils are equal, round, and reactive to light.   Cardiovascular:      Rate and Rhythm: Normal rate and regular rhythm.      Pulses: Normal pulses.      Heart sounds: Normal heart sounds. No murmur heard.  Pulmonary:      Effort: Pulmonary effort is normal. No respiratory distress.      Breath sounds: Normal breath sounds. No wheezing or rales.   Abdominal:      General: Abdomen is flat. Bowel sounds are normal. There is no distension.      Palpations: Abdomen is soft. There is no mass.      Tenderness: There is no abdominal tenderness.   Musculoskeletal:         General: No swelling or deformity. Normal range of motion.      Cervical back: Normal range of motion and neck supple.      Right lower leg: No edema.      Left lower leg: No edema.   Lymphadenopathy:      Cervical: No cervical adenopathy.   Skin:     General: Skin is warm and dry.      Capillary Refill: Capillary refill takes less than 2 seconds.      Findings: No lesion or rash.   Neurological:      General: No focal deficit present.      Mental Status: He is alert and oriented to person, place, and time.      Cranial Nerves: No cranial nerve deficit.      Motor: No weakness.   Psychiatric:         Mood and Affect: Mood normal.         Behavior: Behavior normal.         Thought Content: Thought content normal.         Judgment: Judgment normal.         Assessment/Plan   Problem List Items Addressed This Visit       Low testosterone in male    Mild episode of recurrent major depressive disorder (CMS-HCC)    Chronic fatigue    Hypotestosteronemia - Primary    Relevant Medications    testosterone cypionate (Depo-Testosterone) 200 mg/mL injection    syringe with needle 3  "mL 22 x 1 1/2\" syringe    Primary narcolepsy without cataplexy (HHS-HCC)    Class 3 severe obesity due to excess calories with serious comorbidity and body mass index (BMI) of 40.0 to 44.9 in adult (Multi)       Patient education provided.  Stay current with age appropriate health maintenance as instructed.  Appointment here or ER with new or worsening symptoms'  Keep appropriate follow-up visit.  Stay current with proper immunizations   Weight loss and diet    Refills as above  90-day reevaluation  Report suicidal ideation  Report psychotic or manic symptoms  "

## 2024-09-26 ENCOUNTER — APPOINTMENT (OUTPATIENT)
Dept: SLEEP MEDICINE | Facility: CLINIC | Age: 49
End: 2024-09-26
Payer: COMMERCIAL

## 2024-09-30 ENCOUNTER — APPOINTMENT (OUTPATIENT)
Dept: PRIMARY CARE | Facility: CLINIC | Age: 49
End: 2024-09-30
Payer: COMMERCIAL

## 2024-09-30 VITALS
RESPIRATION RATE: 16 BRPM | OXYGEN SATURATION: 96 % | WEIGHT: 250 LBS | TEMPERATURE: 96.9 F | SYSTOLIC BLOOD PRESSURE: 110 MMHG | HEIGHT: 66 IN | HEART RATE: 53 BPM | DIASTOLIC BLOOD PRESSURE: 72 MMHG | BODY MASS INDEX: 40.18 KG/M2

## 2024-09-30 DIAGNOSIS — F33.0 MILD EPISODE OF RECURRENT MAJOR DEPRESSIVE DISORDER (CMS-HCC): ICD-10-CM

## 2024-09-30 DIAGNOSIS — G47.419 PRIMARY NARCOLEPSY WITHOUT CATAPLEXY (HHS-HCC): ICD-10-CM

## 2024-09-30 DIAGNOSIS — R79.89 LOW TESTOSTERONE IN MALE: ICD-10-CM

## 2024-09-30 DIAGNOSIS — E34.9 HYPOTESTOSTERONEMIA: Primary | ICD-10-CM

## 2024-09-30 DIAGNOSIS — E66.813 CLASS 3 SEVERE OBESITY DUE TO EXCESS CALORIES WITH SERIOUS COMORBIDITY AND BODY MASS INDEX (BMI) OF 40.0 TO 44.9 IN ADULT: ICD-10-CM

## 2024-09-30 DIAGNOSIS — E66.01 CLASS 3 SEVERE OBESITY DUE TO EXCESS CALORIES WITH SERIOUS COMORBIDITY AND BODY MASS INDEX (BMI) OF 40.0 TO 44.9 IN ADULT: ICD-10-CM

## 2024-09-30 DIAGNOSIS — R53.82 CHRONIC FATIGUE: ICD-10-CM

## 2024-09-30 PROCEDURE — 99214 OFFICE O/P EST MOD 30 MIN: CPT | Performed by: FAMILY MEDICINE

## 2024-09-30 PROCEDURE — 1036F TOBACCO NON-USER: CPT | Performed by: FAMILY MEDICINE

## 2024-09-30 PROCEDURE — 3008F BODY MASS INDEX DOCD: CPT | Performed by: FAMILY MEDICINE

## 2024-09-30 RX ORDER — SYRINGE W-NEEDLE,DISPOSAB,3 ML 25GX5/8"
SYRINGE, EMPTY DISPOSABLE MISCELLANEOUS
Qty: 50 EACH | Refills: 1 | Status: SHIPPED | OUTPATIENT
Start: 2024-09-30

## 2024-09-30 RX ORDER — TESTOSTERONE CYPIONATE 200 MG/ML
INJECTION, SOLUTION INTRAMUSCULAR
Qty: 2 ML | Refills: 1 | Status: SHIPPED | OUTPATIENT
Start: 2024-09-30

## 2024-09-30 ASSESSMENT — ENCOUNTER SYMPTOMS
HEMATURIA: 0
WEAKNESS: 0
CONSTIPATION: 0
DEPRESSION: 0
SHORTNESS OF BREATH: 0
COUGH: 0
DIZZINESS: 0
BRUISES/BLEEDS EASILY: 0
BLOOD IN STOOL: 0
VOMITING: 0
NUMBNESS: 0
ADENOPATHY: 0
BACK PAIN: 0
OCCASIONAL FEELINGS OF UNSTEADINESS: 0
DIARRHEA: 0
FATIGUE: 0
LOSS OF SENSATION IN FEET: 0
ABDOMINAL PAIN: 0
DIFFICULTY URINATING: 0
EYE DISCHARGE: 0
ACTIVITY CHANGE: 0
NERVOUS/ANXIOUS: 0
SORE THROAT: 0
HEADACHES: 0
ARTHRALGIAS: 0
CHEST TIGHTNESS: 0
NAUSEA: 0
MYALGIAS: 0
DYSPHORIC MOOD: 0
NECK PAIN: 0

## 2024-09-30 ASSESSMENT — PATIENT HEALTH QUESTIONNAIRE - PHQ9
10. IF YOU CHECKED OFF ANY PROBLEMS, HOW DIFFICULT HAVE THESE PROBLEMS MADE IT FOR YOU TO DO YOUR WORK, TAKE CARE OF THINGS AT HOME, OR GET ALONG WITH OTHER PEOPLE: SOMEWHAT DIFFICULT
2. FEELING DOWN, DEPRESSED OR HOPELESS: SEVERAL DAYS
SUM OF ALL RESPONSES TO PHQ9 QUESTIONS 1 AND 2: 1
1. LITTLE INTEREST OR PLEASURE IN DOING THINGS: NOT AT ALL

## 2024-10-10 ENCOUNTER — APPOINTMENT (OUTPATIENT)
Dept: SLEEP MEDICINE | Facility: CLINIC | Age: 49
End: 2024-10-10
Payer: COMMERCIAL

## 2024-11-14 NOTE — PROGRESS NOTES
"Subjective   Patient ID: Jeronimo Christensen is a 49 y.o. male who presents for Depression.  HPI    Would like to discuss depression medications, not helping very well    Would like a therapy referral     Depression symptoms ongoing  No suicidal or homicidal ideation no psychotic or manic symptoms  Discussed treatment options    Obese recommend weight loss and diet    Hypergonadism stable  Blood work noted  Recommend replace discussed options      Review of Systems   Constitutional:  Negative for activity change and fatigue.   HENT:  Negative for congestion and sore throat.    Eyes:  Negative for discharge.   Respiratory:  Negative for cough, chest tightness and shortness of breath.    Cardiovascular:  Negative for chest pain and leg swelling.   Gastrointestinal:  Negative for abdominal pain, blood in stool, constipation, diarrhea, nausea and vomiting.   Endocrine: Negative for cold intolerance and heat intolerance.   Genitourinary:  Negative for difficulty urinating and hematuria.   Musculoskeletal:  Negative for arthralgias, back pain, gait problem, myalgias and neck pain.   Allergic/Immunologic: Negative for environmental allergies.   Neurological:  Negative for dizziness, syncope, weakness, numbness and headaches.   Hematological:  Negative for adenopathy. Does not bruise/bleed easily.   Psychiatric/Behavioral:  Negative for dysphoric mood. The patient is not nervous/anxious.    All other systems reviewed and are negative.      Objective   /82 (BP Location: Right arm, BP Cuff Size: Large adult)   Pulse 88   Ht 1.676 m (5' 6\")   Wt 114 kg (252 lb 3.2 oz)   SpO2 97%   BMI 40.71 kg/m²    Physical Exam  Vitals and nursing note reviewed.   Constitutional:       General: He is not in acute distress.     Appearance: Normal appearance.   HENT:      Head: Normocephalic and atraumatic.      Right Ear: Tympanic membrane, ear canal and external ear normal.      Left Ear: Tympanic membrane, ear canal and external " ear normal.      Nose: Nose normal.      Mouth/Throat:      Mouth: Mucous membranes are moist.      Pharynx: Oropharynx is clear. No oropharyngeal exudate or posterior oropharyngeal erythema.   Eyes:      Extraocular Movements: Extraocular movements intact.      Conjunctiva/sclera: Conjunctivae normal.      Pupils: Pupils are equal, round, and reactive to light.   Cardiovascular:      Rate and Rhythm: Normal rate and regular rhythm.      Pulses: Normal pulses.      Heart sounds: Normal heart sounds. No murmur heard.  Pulmonary:      Effort: Pulmonary effort is normal. No respiratory distress.      Breath sounds: Normal breath sounds. No wheezing or rales.   Abdominal:      General: Abdomen is flat. Bowel sounds are normal. There is no distension.      Palpations: Abdomen is soft. There is no mass.      Tenderness: There is no abdominal tenderness.   Musculoskeletal:         General: No swelling or deformity. Normal range of motion.      Cervical back: Normal range of motion and neck supple.      Right lower leg: No edema.      Left lower leg: No edema.   Lymphadenopathy:      Cervical: No cervical adenopathy.   Skin:     General: Skin is warm and dry.      Capillary Refill: Capillary refill takes less than 2 seconds.      Findings: No lesion or rash.   Neurological:      General: No focal deficit present.      Mental Status: He is alert and oriented to person, place, and time.      Cranial Nerves: No cranial nerve deficit.      Motor: No weakness.   Psychiatric:         Mood and Affect: Mood normal.         Behavior: Behavior normal.         Thought Content: Thought content normal.         Judgment: Judgment normal.         Assessment/Plan   Problem List Items Addressed This Visit       Low testosterone in male    Mild episode of recurrent major depressive disorder (CMS-HCC)    Relevant Medications    buPROPion XL (Wellbutrin XL) 150 mg 24 hr tablet    Other Relevant Orders    Referral to Psychology    Class 3  severe obesity due to excess calories with serious comorbidity and body mass index (BMI) of 40.0 to 44.9 in adult - Primary     Other Visit Diagnoses       Major depressive disorder, single episode, mild (CMS-HCC)        Relevant Medications    citalopram (CeleXA) 20 mg tablet            Patient education provided.  Stay current with age appropriate health maintenance as instructed.  Appointment here or ER with new or worsening symptoms'  Keep appropriate follow-up visit.  Stay current with proper immunizations     Refer to psychology  Medicine as above  Report new or worsening symptoms  Keep follow-up visit

## 2024-11-15 ENCOUNTER — OFFICE VISIT (OUTPATIENT)
Dept: PRIMARY CARE | Facility: CLINIC | Age: 49
End: 2024-11-15
Payer: COMMERCIAL

## 2024-11-15 VITALS
HEART RATE: 88 BPM | HEIGHT: 66 IN | SYSTOLIC BLOOD PRESSURE: 127 MMHG | BODY MASS INDEX: 40.53 KG/M2 | WEIGHT: 252.2 LBS | DIASTOLIC BLOOD PRESSURE: 82 MMHG | OXYGEN SATURATION: 97 %

## 2024-11-15 DIAGNOSIS — R79.89 LOW TESTOSTERONE IN MALE: ICD-10-CM

## 2024-11-15 DIAGNOSIS — E66.813 CLASS 3 SEVERE OBESITY DUE TO EXCESS CALORIES WITH SERIOUS COMORBIDITY AND BODY MASS INDEX (BMI) OF 40.0 TO 44.9 IN ADULT: ICD-10-CM

## 2024-11-15 DIAGNOSIS — F33.0 MILD EPISODE OF RECURRENT MAJOR DEPRESSIVE DISORDER (CMS-HCC): ICD-10-CM

## 2024-11-15 DIAGNOSIS — F32.0 MAJOR DEPRESSIVE DISORDER, SINGLE EPISODE, MILD (CMS-HCC): Primary | ICD-10-CM

## 2024-11-15 DIAGNOSIS — E66.01 CLASS 3 SEVERE OBESITY DUE TO EXCESS CALORIES WITH SERIOUS COMORBIDITY AND BODY MASS INDEX (BMI) OF 40.0 TO 44.9 IN ADULT: ICD-10-CM

## 2024-11-15 PROCEDURE — 99214 OFFICE O/P EST MOD 30 MIN: CPT | Performed by: FAMILY MEDICINE

## 2024-11-15 PROCEDURE — 3008F BODY MASS INDEX DOCD: CPT | Performed by: FAMILY MEDICINE

## 2024-11-15 RX ORDER — BUPROPION HYDROCHLORIDE 150 MG/1
150 TABLET ORAL EVERY MORNING
Qty: 30 TABLET | Refills: 5 | Status: SHIPPED | OUTPATIENT
Start: 2024-11-15 | End: 2025-05-14

## 2024-11-15 RX ORDER — CITALOPRAM 20 MG/1
20 TABLET, FILM COATED ORAL DAILY
Qty: 30 TABLET | Refills: 3 | Status: SHIPPED | OUTPATIENT
Start: 2024-11-15 | End: 2025-03-15

## 2024-11-15 ASSESSMENT — ENCOUNTER SYMPTOMS
MYALGIAS: 0
ABDOMINAL PAIN: 0
HEMATURIA: 0
FATIGUE: 0
BACK PAIN: 0
EYE DISCHARGE: 0
DIZZINESS: 0
NECK PAIN: 0
CONSTIPATION: 0
NAUSEA: 0
DIFFICULTY URINATING: 0
DYSPHORIC MOOD: 0
WEAKNESS: 0
CHEST TIGHTNESS: 0
SORE THROAT: 0
BRUISES/BLEEDS EASILY: 0
ARTHRALGIAS: 0
VOMITING: 0
SHORTNESS OF BREATH: 0
ACTIVITY CHANGE: 0
HEADACHES: 0
DIARRHEA: 0
COUGH: 0
BLOOD IN STOOL: 0
NUMBNESS: 0
ADENOPATHY: 0
NERVOUS/ANXIOUS: 0

## 2024-11-24 ENCOUNTER — PATIENT MESSAGE (OUTPATIENT)
Dept: PRIMARY CARE | Facility: CLINIC | Age: 49
End: 2024-11-24
Payer: COMMERCIAL

## 2024-11-24 ENCOUNTER — PATIENT MESSAGE (OUTPATIENT)
Facility: CLINIC | Age: 49
End: 2024-11-24
Payer: COMMERCIAL

## 2024-11-24 DIAGNOSIS — F32.0 MAJOR DEPRESSIVE DISORDER, SINGLE EPISODE, MILD (CMS-HCC): ICD-10-CM

## 2024-11-24 DIAGNOSIS — G47.419 PRIMARY NARCOLEPSY WITHOUT CATAPLEXY (HHS-HCC): ICD-10-CM

## 2024-11-25 RX ORDER — CITALOPRAM 20 MG/1
20 TABLET, FILM COATED ORAL DAILY
Qty: 30 TABLET | Refills: 3 | Status: SHIPPED | OUTPATIENT
Start: 2024-11-25 | End: 2025-03-25

## 2024-11-26 DIAGNOSIS — G47.419 PRIMARY NARCOLEPSY WITHOUT CATAPLEXY (HHS-HCC): Primary | ICD-10-CM

## 2024-11-26 RX ORDER — MODAFINIL 100 MG/1
100 TABLET ORAL 2 TIMES DAILY PRN
Qty: 60 TABLET | Refills: 0 | Status: SHIPPED | OUTPATIENT
Start: 2024-11-26 | End: 2024-12-26

## 2024-12-09 NOTE — PROGRESS NOTES
Patient: Jeronimo Christensen  : 1975 AGE: 49 y.o. SEX:male   MRN: [id number]   Provider: DUSTIN Mcleod-CNP     Location [facility]   Service Date: 2024     PCP: Ivan Adams MD   Referred by: No ref. provider found          [facility]  Follow Up Visit Note  Virtual or Telephone Consent  An interactive audio and video telecommunication system which permits real time communications between the patient (at the originating site) and provider (at the distant site) was utilized to provide this telehealth service.   Verbal consent was requested and obtained from Jeronimo Christensen on this date, 24 for a telehealth visit.      HISTORY OF PRESENT ILLNESS     Jeronimo Christensen is a 49 y.o. male with a h/o MASON, Obesity, and Depression who presents to [facility] for follow up.     SLEEP STUDY HISTORY (personally reviewed raw data such as interpretation report, data sheet, hypnogram, and titration table if available and applicable)  - HSAT 2023-showing severe MASON with KAMI 3% 65.7 KAMI 4% 52.7, SpO2 gaye 75%.  -PAP titration 10/27/2023-successful titration with CPAP at 15 cm H2O in which sleep disordered breathing, sleep-related hypoxemia, and snoring were resolved.  Optimal mask was ResMed AirFit P30 I nasal pillow size medium.  - PSG 24 on CPAP- RDI 3% 8.5, RDI 4% 1.4, SpO2 gaye 91% on CPAP 15-18CWP. PLM index of 68.6/hr. Clinical correlation suggested. MSLT performed next day.  - MSLT 24- revealed a mean SL of 6.1 min with 2 SOREMPs consistent with diagnosis of narcolepsy. Recommend working on consistent sufficient sleep duration.         Media Information - P10 Pillow (med) - Airsense 10 - 18                 SLEEP HISTORY:    24: TST 7.5 hours/night. No issues with CPAP, using every night with positive benefits. PERCEIVED BENEFITS OF PAP: decreased or no snoring decreased nocturnal  awakenings sleeping for a longer duration of time better sleep quality.  Denies any bothersome symptoms or air leak.  Comfortable with P10 mask and pressure setting. Narcolepsy well controlled on modafinil, most days taking 100mg around 6:30AM. Some days takes second dose. Feels slightly more antsy with medication but no other side effects, benefiting in reducing EDS/sleep attacks. Depression meds recently adjusted by PCP; currently on Celexa at night, wellbutrin in AM. Has appointment next month w psychiatry to establish care. --> supplies ordered       9/17/24: Narcolepsy/MASON follow up. Reports 75% improvement since starting modafinil. Takes 1 tab (100mg) at 5:30AM. Denies any significant medication side effects. Some days feels slightly more anxious but not problem causing. ESS 19 last visit, ESS 1 today.  Now sleeping better. No more prolonged awakenings. Sleep schedule 10PM to 5:15AM. Using CPAP every night, comfortable with current pressure and P10 mask fit.  Denies any bothersome symptoms.  Needs replacement supplies. ----> ordered    8/6/24: Here to review MSLT results. ESS 19 today. Sleep schedule 9:45PM-5:15AM on weekdays and 11:15PM to 7AM on weekends. Naps on the weekends for 2 hours which is refreshing. Has been waking 1x/night for prolonged period (2 hours) since restarting Celexa after sleep study completed. Also going through life stressors with his mother being ill and starting with hospice care. ----> start Modafinil 100mg BID PRN     6/27/24: NPV, referred by neurology- Dr. Romero with concerns of sleep attacks. Patient was dx with MASON about 8 yrs ago and has been on CPAP since. Most recent sleep study in 2023 showed severe MASON.  Currently on CPAP 15 cm H2O with EPR/Flex 3 and AirFit P10 NP size medium mask through [employer] Patient has been using machine every night. Patient denies machine problems, mask leak, air hunger, aerophagia, dry mouth, skin irritation, and nasal  "congestion. The following are patient's perceived benefits of PAP: no snoring on PAP, decreased daytime sleepiness and/or fatigue, sleeps faster at bedtime, decreased nocturnal awakening, and better quality of sleep. Despite using PAP every night, he is concerned with his EDS/sleep attacks. Started around age 21, when he remembers falling asleep during basic training for the [employer]. He describes episodes of sleep attacks while at work, watching television, watching a movie, or being at a concert. He does sleep about 6.5- 7 hours/night. He uses a CPAP machine for MASON. He notes that there are times where he is falling asleep and dreams right away. He denies any sudden muscle weakness provoked by high emotion. Reports some sleep paralysis and hypnagogic hallucinations.      ESS: 8      REVIEW OF SYSTEMS     All other systems have been reviewed and are negative.    ALLERGIES     Allergies   Allergen Reactions    Cephalexin Unknown       MEDICATIONS     Current Outpatient Medications   Medication Sig Dispense Refill    buPROPion XL (Wellbutrin XL) 150 mg 24 hr tablet Take 1 tablet (150 mg) by mouth once daily in the morning. Do not crush, chew, or split. 30 tablet 5    citalopram (CeleXA) 20 mg tablet Take 1 tablet (20 mg) by mouth once daily. 30 tablet 3    modafinil (Provigil) 100 mg tablet Take 1 tablet (100 mg) by mouth 2 times a day as needed (sleepiness). 60 tablet 0    syringe with needle 3 mL 22 x 1 1/2\" syringe USE EVERY TWO WEEKS TO INJECT TESTOSTERONE. 50 each 1    testosterone cypionate (Depo-Testosterone) 200 mg/mL injection INJECT 1 ML INTRAMUSCULARLY EVERY 2 WEEKS 2 mL 1     No current facility-administered medications for this visit.       PAST HISTORIES     PERTINENT PAST MEDICAL HISTORY: See HPI    PERTINENT PAST SURGICAL HISTORY for Sleep Medicine:  non-contributory    PERTINENT FAMILY HISTORY for Sleep Medicine:  sleep apnea on PAP- mother, father, brothers x 2     PERTINENT SOCIAL HISTORY:  He  " "reports that he has never smoked. He has never been exposed to tobacco smoke. He has never used smokeless tobacco. He reports that he does not currently use alcohol. He reports that he does not use drugs. 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     Active Problems, Allergy List, Medication List, and PMH/PSH/FH/Social Hx have been reviewed and reconciled in chart. No significant changes unless documented in the pertinent chart section. Updates made when necessary.     PHYSICAL EXAM     VITAL SIGNS: There were no vitals taken for this visit.    CURRENT WEIGHT:   There were no vitals filed for this visit.    PREVIOUS WEIGHTS:  Wt Readings from Last 3 Encounters:   11/15/24 114 kg (252 lb 3.2 oz)   09/30/24 113 kg (250 lb)   08/06/24 116 kg (256 lb 12.8 oz)     Limited telehealth examination  PHYSICAL EXAMINATION  General appearance: awake alert in NAD  Affect: normal  HEENT: Nasal congestion absent  Lungs: no cough.  Neuro: normal speech      RESULTS/DATA     No results found for: \"IRON\", \"TRANSFERRIN\", \"IRONSAT\", \"TIBC\", \"FERRITIN\"    Bicarbonate   Date Value Ref Range Status   03/04/2024 28 21 - 32 mmol/L Final       ASSESSMENT/PLAN     Mr. Christensen is a 49 y.o. male and He was referred to the [facility] for evaluation of MASON and Narcolepsy     Problem List, Orders, Assessment, Recommendations:    #MASON, severe  - HSAT 9/28/2023-showing severe MASON with KAMI 3% 65.7 KAMI 4% 52.7, SpO2 gaye 75%.  - PAP titration 10/27/2023-successful titration with CPAP at 15 cm H2O in which sleep disordered breathing, sleep-related hypoxemia, and snoring were resolved.  Optimal mask was ResMed AirFit P30 I nasal pillow size medium.  - Retrieved and personally reviewed recent PAP adherence download data today. See HPI.  - excellent compliance to PAP therapy, residual AHI at goal (2.6), and good control of MASON symptoms  - continue current setting 15 CWP  - Supply renewal order placed to " DME- [facility] (P10)  - diet, exercise, and weight loss were emphasized today in clinic, as were non-supine sleep, avoiding alcohol in the late evening, and driving or operating heavy machinery when sleepy. Patient verbalized understanding.     #NARCOLEPSY without cataplexy   -MSLT on 7/31/2024 revealed a mean SL of 6.1 min with 2 SOREMPs.   -Continue modafinil 100 mg once daily and may take another dose in the afternoon as needed for breakthrough sleepiness. Consider taking 50mg @ 5:30 and 50mg at noon. Do not take past 3 PM. Call for refill when needed   -Discussed side effects of modafinil   -Advised patient to monitor blood pressure once daily and call clinic if BP>160/100 mm Hg.  -OARRS report checked today. No signs of abuse.   -Controlled substance agreement signed today 8/6/24  -Urine drug screen completed 7/31/24- normal  -Discussed safety concerns with the patient including: narcolepsy itself is not deadly, but it may be dangerous if episodes occur during driving, operating machinery, or similar activities.   Possible issues and complications with narcolepsy include difficulty with social activities, injuries and accidents if sleep attacks occur during the activity, and side effects of medications used to treat the disorder.  Supportive management:   Plan naps to control daytime sleepiness and reduce the number of unplanned sudden sleep attacks. Schedule a brief nap (10-15 minutes) in strategic times during the day, if possible.   Inform teachers or work supervisors about the condition.   Maintain a regular sleep-wake schedule and practice good sleep hygiene.   Expose self to bright light in daytime.   Exercise daily in daytime as it can beneficial and STIMULATING.   Avoid alcohol and sedative-hypnotic drugs as these substances may exacerbate sleepiness.   Avoid driving vehicle or operating heavy machinery when sleepy until daytime sleepiness is well-treated. A person driving while sleepy is 5 times more likely  to have an accident. If you feel sleepy, pull over and take a short power nap (sleep for less than 30 minutes). Otherwise, ask somebody to drive you.     #Obesity  BMI Readings from Last 1 Encounters:   11/15/24 40.71 kg/m²     - Encouraged healthy weight loss via diet and exercise  - Weight loss can help in the long term treatment of MASON.  - Defer management to PCP     All of patient's questions were answered. He verbalizes understanding and agreement with my assessment and plan.    Disposition    Return to clinic in 3 months

## 2024-12-16 ENCOUNTER — APPOINTMENT (OUTPATIENT)
Dept: SLEEP MEDICINE | Facility: CLINIC | Age: 49
End: 2024-12-16
Payer: COMMERCIAL

## 2024-12-16 DIAGNOSIS — E66.01 CLASS 3 SEVERE OBESITY DUE TO EXCESS CALORIES WITH SERIOUS COMORBIDITY AND BODY MASS INDEX (BMI) OF 40.0 TO 44.9 IN ADULT: ICD-10-CM

## 2024-12-16 DIAGNOSIS — Z78.9 NONSMOKER: ICD-10-CM

## 2024-12-16 DIAGNOSIS — G47.419 PRIMARY NARCOLEPSY WITHOUT CATAPLEXY (HHS-HCC): ICD-10-CM

## 2024-12-16 DIAGNOSIS — E66.813 CLASS 3 SEVERE OBESITY DUE TO EXCESS CALORIES WITH SERIOUS COMORBIDITY AND BODY MASS INDEX (BMI) OF 40.0 TO 44.9 IN ADULT: ICD-10-CM

## 2024-12-16 DIAGNOSIS — Z79.899 MEDICATION MANAGEMENT: ICD-10-CM

## 2024-12-16 DIAGNOSIS — G47.33 OSA (OBSTRUCTIVE SLEEP APNEA): Primary | ICD-10-CM

## 2024-12-16 PROBLEM — G47.10 HYPERSOMNIA: Status: RESOLVED | Noted: 2024-06-27 | Resolved: 2024-12-16

## 2024-12-16 PROCEDURE — 99214 OFFICE O/P EST MOD 30 MIN: CPT | Mod: 95 | Performed by: NURSE PRACTITIONER

## 2024-12-16 PROCEDURE — 99214 OFFICE O/P EST MOD 30 MIN: CPT | Performed by: NURSE PRACTITIONER

## 2024-12-16 PROCEDURE — 1036F TOBACCO NON-USER: CPT | Performed by: NURSE PRACTITIONER

## 2024-12-16 ASSESSMENT — SLEEP AND FATIGUE QUESTIONNAIRES
HOW LIKELY ARE YOU TO NOD OFF OR FALL ASLEEP WHILE WATCHING TV: SLIGHT CHANCE OF DOZING
HOW LIKELY ARE YOU TO NOD OFF OR FALL ASLEEP WHILE LYING DOWN TO REST IN THE AFTERNOON WHEN CIRCUMSTANCES PERMIT: HIGH CHANCE OF DOZING
ESS-CHAD TOTAL SCORE: 8
HOW LIKELY ARE YOU TO NOD OFF OR FALL ASLEEP IN A CAR, WHILE STOPPED FOR A FEW MINUTES IN TRAFFIC: WOULD NEVER DOZE
HOW LIKELY ARE YOU TO NOD OFF OR FALL ASLEEP WHILE SITTING QUIETLY AFTER LUNCH WITHOUT ALCOHOL: SLIGHT CHANCE OF DOZING
HOW LIKELY ARE YOU TO NOD OFF OR FALL ASLEEP WHEN YOU ARE A PASSENGER IN A CAR FOR AN HOUR WITHOUT A BREAK: SLIGHT CHANCE OF DOZING
SITING INACTIVE IN A PUBLIC PLACE LIKE A CLASS ROOM OR A MOVIE THEATER: SLIGHT CHANCE OF DOZING
HOW LIKELY ARE YOU TO NOD OFF OR FALL ASLEEP WHILE SITTING AND TALKING TO SOMEONE: WOULD NEVER DOZE
HOW LIKELY ARE YOU TO NOD OFF OR FALL ASLEEP WHILE SITTING AND READING: SLIGHT CHANCE OF DOZING

## 2024-12-16 ASSESSMENT — PATIENT HEALTH QUESTIONNAIRE - PHQ9
1. LITTLE INTEREST OR PLEASURE IN DOING THINGS: NOT AT ALL
2. FEELING DOWN, DEPRESSED OR HOPELESS: NOT AT ALL
SUM OF ALL RESPONSES TO PHQ9 QUESTIONS 1 AND 2: 0

## 2024-12-30 ENCOUNTER — APPOINTMENT (OUTPATIENT)
Dept: PRIMARY CARE | Facility: CLINIC | Age: 49
End: 2024-12-30
Payer: COMMERCIAL

## 2025-01-13 ENCOUNTER — APPOINTMENT (OUTPATIENT)
Dept: PRIMARY CARE | Facility: CLINIC | Age: 50
End: 2025-01-13
Payer: COMMERCIAL

## 2025-01-13 VITALS
HEART RATE: 87 BPM | HEIGHT: 66 IN | WEIGHT: 252.6 LBS | DIASTOLIC BLOOD PRESSURE: 77 MMHG | OXYGEN SATURATION: 96 % | SYSTOLIC BLOOD PRESSURE: 129 MMHG | BODY MASS INDEX: 40.6 KG/M2

## 2025-01-13 DIAGNOSIS — G47.419 PRIMARY NARCOLEPSY WITHOUT CATAPLEXY (HHS-HCC): ICD-10-CM

## 2025-01-13 DIAGNOSIS — F32.0 MAJOR DEPRESSIVE DISORDER, SINGLE EPISODE, MILD (CMS-HCC): Primary | ICD-10-CM

## 2025-01-13 DIAGNOSIS — G47.33 OSA (OBSTRUCTIVE SLEEP APNEA): ICD-10-CM

## 2025-01-13 DIAGNOSIS — F33.0 MILD EPISODE OF RECURRENT MAJOR DEPRESSIVE DISORDER (CMS-HCC): ICD-10-CM

## 2025-01-13 DIAGNOSIS — E66.813 CLASS 3 SEVERE OBESITY DUE TO EXCESS CALORIES WITH SERIOUS COMORBIDITY AND BODY MASS INDEX (BMI) OF 40.0 TO 44.9 IN ADULT: ICD-10-CM

## 2025-01-13 DIAGNOSIS — E66.01 CLASS 3 SEVERE OBESITY DUE TO EXCESS CALORIES WITH SERIOUS COMORBIDITY AND BODY MASS INDEX (BMI) OF 40.0 TO 44.9 IN ADULT: ICD-10-CM

## 2025-01-13 DIAGNOSIS — R79.89 LOW TESTOSTERONE IN MALE: ICD-10-CM

## 2025-01-13 PROCEDURE — 1036F TOBACCO NON-USER: CPT | Performed by: FAMILY MEDICINE

## 2025-01-13 PROCEDURE — 99214 OFFICE O/P EST MOD 30 MIN: CPT | Performed by: FAMILY MEDICINE

## 2025-01-13 PROCEDURE — 3008F BODY MASS INDEX DOCD: CPT | Performed by: FAMILY MEDICINE

## 2025-01-13 ASSESSMENT — ENCOUNTER SYMPTOMS
FATIGUE: 0
ACTIVITY CHANGE: 0
ARTHRALGIAS: 0
NUMBNESS: 0
SHORTNESS OF BREATH: 0
NERVOUS/ANXIOUS: 0
NAUSEA: 0
WEAKNESS: 0
BACK PAIN: 0
BLOOD IN STOOL: 0
HEMATURIA: 0
DIFFICULTY URINATING: 0
VOMITING: 0
DYSPHORIC MOOD: 0
ABDOMINAL PAIN: 0
EYE DISCHARGE: 0
CONSTIPATION: 0
MYALGIAS: 0
DIZZINESS: 0
BRUISES/BLEEDS EASILY: 0
NECK PAIN: 0
COUGH: 0
DIARRHEA: 0
SORE THROAT: 0
CHEST TIGHTNESS: 0
HEADACHES: 0
ADENOPATHY: 0

## 2025-01-13 NOTE — PROGRESS NOTES
"Subjective   Patient ID: Jeronimo Christensen is a 49 y.o. male who presents for Depression.  HPI  Depressed mood ongoing  No suicidal ideation no psychotic or manic symptoms  Will follow with psychiatry    Sleep apnea stable using his CPAP    Narcolepsy stable no progression or worsening    Low T stable continuing replacement    Obese recommend weight loss and diet  Review of Systems   Constitutional:  Negative for activity change and fatigue.   HENT:  Negative for congestion and sore throat.    Eyes:  Negative for discharge.   Respiratory:  Negative for cough, chest tightness and shortness of breath.    Cardiovascular:  Negative for chest pain and leg swelling.   Gastrointestinal:  Negative for abdominal pain, blood in stool, constipation, diarrhea, nausea and vomiting.   Endocrine: Negative for cold intolerance and heat intolerance.   Genitourinary:  Negative for difficulty urinating and hematuria.   Musculoskeletal:  Negative for arthralgias, back pain, gait problem, myalgias and neck pain.   Allergic/Immunologic: Negative for environmental allergies.   Neurological:  Negative for dizziness, syncope, weakness, numbness and headaches.   Hematological:  Negative for adenopathy. Does not bruise/bleed easily.   Psychiatric/Behavioral:  Negative for dysphoric mood. The patient is not nervous/anxious.    All other systems reviewed and are negative.      Objective   /77 (BP Location: Left arm, BP Cuff Size: Large adult)   Pulse 87   Ht 1.676 m (5' 6\")   Wt 115 kg (252 lb 9.6 oz)   SpO2 96%   BMI 40.77 kg/m²    Physical Exam  Vitals and nursing note reviewed.   Constitutional:       General: He is not in acute distress.     Appearance: Normal appearance. He is obese.   HENT:      Head: Normocephalic and atraumatic.      Right Ear: Tympanic membrane, ear canal and external ear normal.      Left Ear: Tympanic membrane, ear canal and external ear normal.      Nose: Nose normal.      Mouth/Throat:      Mouth: Mucous " membranes are moist.      Pharynx: Oropharynx is clear. No oropharyngeal exudate or posterior oropharyngeal erythema.   Eyes:      Extraocular Movements: Extraocular movements intact.      Conjunctiva/sclera: Conjunctivae normal.      Pupils: Pupils are equal, round, and reactive to light.   Cardiovascular:      Rate and Rhythm: Normal rate and regular rhythm.      Pulses: Normal pulses.      Heart sounds: Normal heart sounds. No murmur heard.  Pulmonary:      Effort: Pulmonary effort is normal. No respiratory distress.      Breath sounds: Normal breath sounds. No wheezing or rales.   Abdominal:      General: Abdomen is flat. Bowel sounds are normal. There is no distension.      Palpations: Abdomen is soft. There is no mass.      Tenderness: There is no abdominal tenderness.   Musculoskeletal:         General: No swelling or deformity. Normal range of motion.      Cervical back: Normal range of motion and neck supple.      Right lower leg: No edema.      Left lower leg: No edema.   Lymphadenopathy:      Cervical: No cervical adenopathy.   Skin:     General: Skin is warm and dry.      Capillary Refill: Capillary refill takes less than 2 seconds.      Findings: No lesion or rash.   Neurological:      General: No focal deficit present.      Mental Status: He is alert and oriented to person, place, and time.      Cranial Nerves: No cranial nerve deficit.      Motor: No weakness.   Psychiatric:         Mood and Affect: Mood normal.         Behavior: Behavior normal.         Thought Content: Thought content normal.         Judgment: Judgment normal.         Assessment/Plan   Problem List Items Addressed This Visit       Low testosterone in male    Mild episode of recurrent major depressive disorder (CMS-HCC)    MASON (obstructive sleep apnea)    Primary narcolepsy without cataplexy (HHS-HCC)    Class 3 severe obesity due to excess calories with serious comorbidity and body mass index (BMI) of 40.0 to 44.9 in adult     Other  Visit Diagnoses       Major depressive disorder, single episode, mild (CMS-HCC)    -  Primary    Relevant Orders    Follow Up In [facility] - PCP            Patient education provided.  Stay current with age appropriate health maintenance as instructed.  Appointment here or ER with new or worsening symptoms'  Keep appropriate follow-up visit.  Stay current with proper immunizations     Weight loss and diet  Recheck 3 months and as needed  Keep psychiatric follow-up  Report suicidal ideation report psychotic or manic symptoms

## 2025-01-20 DIAGNOSIS — E34.9 HYPOTESTOSTERONEMIA: ICD-10-CM

## 2025-01-20 RX ORDER — TESTOSTERONE CYPIONATE 200 MG/ML
INJECTION, SOLUTION INTRAMUSCULAR
Qty: 2 ML | Refills: 1 | Status: SHIPPED | OUTPATIENT
Start: 2025-01-20

## 2025-01-20 NOTE — TELEPHONE ENCOUNTER
Rx Refill Request     Name: Jeronimo Christensen  :  1975   Medication Name:  testosterone cypionate (Depo-Testosterone) 200 mg/mL injection   Specific Pharmacy location:  [address]   Date of last appointment:  2025   Date of next appointment:  4/15/2025   Best number to reach patient:  [phone]

## 2025-03-14 NOTE — PROGRESS NOTES
Patient: Jeronimo Christensen  : 1975 AGE: 50 y.o. SEX:male   MRN: [id number]   Provider: DUSTIN Mcleod-CNP     Location [facility]   Service Date: 3/31/2025     PCP: Ivan Adams MD   Referred by: No ref. provider found          [facility]  Follow Up Visit Note  Virtual or Telephone Consent  An interactive audio and video telecommunication system which permits real time communications between the patient (at the originating site) and provider (at the distant site) was utilized to provide this telehealth service.   Verbal consent was requested and obtained from Jeronimo Christensen on this date, 25 for a telehealth visit.      HISTORY OF PRESENT ILLNESS     Jeronimo Christensen is a 50 y.o. male with a h/o MASON, Obesity, and Depression who presents to [facility] for follow up.     SLEEP STUDY HISTORY (personally reviewed raw data such as interpretation report, data sheet, hypnogram, and titration table if available and applicable)  - HSAT 2023-showing severe MASON with KAMI 3% 65.7 KAMI 4% 52.7, SpO2 gaye 75%.  -PAP titration 10/27/2023-successful titration with CPAP at 15 cm H2O in which sleep disordered breathing, sleep-related hypoxemia, and snoring were resolved.  Optimal mask was ResMed AirFit P30 I nasal pillow size medium.  - PSG 24 on CPAP- RDI 3% 8.5, RDI 4% 1.4, SpO2 gaye 91% on CPAP 15-18CWP. PLM index of 68.6/hr. Clinical correlation suggested. MSLT performed next day.  - MSLT 24- revealed a mean SL of 6.1 min with 2 SOREMPs consistent with diagnosis of narcolepsy. Recommend working on consistent sufficient sleep duration.       SLEEP HISTORY:    3/31/25: Wife reports some breakthrough snoring on PAP. Last week had 3 days of waking too early before his alarm clock. Average TST 7hrs/night. Narcolepsy pretty well managed on current meds. Takes modafinil 100mg around 6:30AM. Does not usually  need second breakthrough dosage.       12/16/24: TST 7.5 hours/night. No issues with CPAP, using every night with positive benefits. PERCEIVED BENEFITS OF PAP: decreased or no snoring decreased nocturnal awakenings sleeping for a longer duration of time better sleep quality.  Denies any bothersome symptoms or air leak.  Comfortable with P10 mask and pressure setting. Narcolepsy well controlled on modafinil, most days taking 100mg around 6:30AM. Some days takes second dose. Feels slightly more antsy with medication but no other side effects, benefiting in reducing EDS/sleep attacks. Depression meds recently adjusted by PCP; currently on Celexa at night, wellbutrin in AM. Has appointment next month w psychiatry to establish care. --> supplies ordered     9/17/24: Narcolepsy/MASON follow up. Reports 75% improvement since starting modafinil. Takes 1 tab (100mg) at 5:30AM. Denies any significant medication side effects. Some days feels slightly more anxious but not problem causing. ESS 19 last visit, ESS 1 today.  Now sleeping better. No more prolonged awakenings. Sleep schedule 10PM to 5:15AM. Using CPAP every night, comfortable with current pressure and P10 mask fit.  Denies any bothersome symptoms.  Needs replacement supplies. ----> ordered    8/6/24: Here to review MSLT results. ESS 19 today. Sleep schedule 9:45PM-5:15AM on weekdays and 11:15PM to 7AM on weekends. Naps on the weekends for 2 hours which is refreshing. Has been waking 1x/night for prolonged period (2 hours) since restarting Celexa after sleep study completed. Also going through life stressors with his mother being ill and starting with hospice care. ----> start Modafinil 100mg BID PRN     6/27/24: NPV, referred by neurology- Dr. Romero with concerns of sleep attacks. Patient was dx with MASON about 8 yrs ago and has been on CPAP since. Most recent sleep study in 2023 showed severe MASON.  Currently on CPAP 15 cm H2O with EPR/Flex 3 and AirFit P10 NP size  "medium mask through [facility] [employer]. Patient has been using machine every night. Patient denies machine problems, mask leak, air hunger, aerophagia, dry mouth, skin irritation, and nasal congestion. The following are patient's perceived benefits of PAP: no snoring on PAP, decreased daytime sleepiness and/or fatigue, sleeps faster at bedtime, decreased nocturnal awakening, and better quality of sleep. Despite using PAP every night, he is concerned with his EDS/sleep attacks. Started around age 21, when he remembers falling asleep during basic training for the [employer]. He describes episodes of sleep attacks while at work, watching television, watching a movie, or being at a concert. He does sleep about 6.5- 7 hours/night. He uses a CPAP machine for MASON. He notes that there are times where he is falling asleep and dreams right away. He denies any sudden muscle weakness provoked by high emotion. Reports some sleep paralysis and hypnagogic hallucinations.      ESS: 9      REVIEW OF SYSTEMS     All other systems have been reviewed and are negative.    ALLERGIES     Allergies   Allergen Reactions    Cephalexin Unknown       MEDICATIONS     Current Outpatient Medications   Medication Sig Dispense Refill    buPROPion XL (Wellbutrin XL) 150 mg 24 hr tablet Take 1 tablet (150 mg) by mouth once daily in the morning. Do not crush, chew, or split. 30 tablet 5    citalopram (CeleXA) 20 mg tablet TAKE 1 TABLET BY MOUTH EVERY DAY 30 tablet 3    modafinil (Provigil) 100 mg tablet Take 1 tablet (100 mg) by mouth 2 times a day as needed (sleepiness). 60 tablet 0    syringe with needle 3 mL 22 x 1 1/2\" syringe USE EVERY TWO WEEKS TO INJECT TESTOSTERONE. 50 each 1    testosterone cypionate (Depo-Testosterone) 200 mg/mL injection INJECT 1 ML INTRAMUSCULARLY EVERY 2 WEEKS 2 mL 1     No current facility-administered medications for this visit.       PAST HISTORIES     PERTINENT PAST MEDICAL HISTORY: See HPI    PERTINENT PAST " "SURGICAL HISTORY for Sleep Medicine:  non-contributory    PERTINENT FAMILY HISTORY for Sleep Medicine:  sleep apnea on PAP- mother, father, brothers x 2     PERTINENT SOCIAL HISTORY:  He  reports that he has never smoked. He has never been exposed to tobacco smoke. He has never used smokeless tobacco. He reports that he does not currently use alcohol. He reports that he does not use drugs. 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     Active Problems, Allergy List, Medication List, and PMH/PSH/FH/Social Hx have been reviewed and reconciled in chart. No significant changes unless documented in the pertinent chart section. Updates made when necessary.     PHYSICAL EXAM     VITAL SIGNS: There were no vitals taken for this visit.    CURRENT WEIGHT:   There were no vitals filed for this visit.    PREVIOUS WEIGHTS:  Wt Readings from Last 3 Encounters:   01/13/25 115 kg (252 lb 9.6 oz)   11/15/24 114 kg (252 lb 3.2 oz)   09/30/24 113 kg (250 lb)     Limited telehealth examination  PHYSICAL EXAMINATION  General appearance: awake alert in NAD  Affect: normal  HEENT: Nasal congestion absent  Lungs: no cough.  Neuro: normal speech      RESULTS/DATA     No results found for: \"IRON\", \"TRANSFERRIN\", \"IRONSAT\", \"TIBC\", \"FERRITIN\"    Bicarbonate   Date Value Ref Range Status   03/04/2024 28 21 - 32 mmol/L Final       ASSESSMENT/PLAN     Mr. Christensen is a 50 y.o. male and He was referred to the [facility] for evaluation of MASON and Narcolepsy     Problem List, Orders, Assessment, Recommendations:    #MASON, severe  - HSAT 9/28/2023-showing severe MASON with KAMI 3% 65.7 KAMI 4% 52.7, SpO2 gaye 75%.  - PAP titration 10/27/2023-successful titration with CPAP at 15 cm H2O in which sleep disordered breathing, sleep-related hypoxemia, and snoring were resolved.  Optimal mask was ResMed AirFit P30 I nasal pillow size medium.  - Retrieved and personally reviewed recent PAP adherence download data " today. See HPI.  - excellent compliance to PAP therapy, residual AHI slightly above goal (8.2), and some breakthrough snoring----> increase from CPAP 16 to 17 CWP, changes made in [facility] today   - Supply renewal order placed to DME- [facility] (P10) He has not received supplies as ordered during previous visit in December, contacted [facility] rep Ade Hare and they will contact pt to arrange replacement supplies   - diet, exercise, and weight loss were emphasized today in clinic, as were non-supine sleep, avoiding alcohol in the late evening, and driving or operating heavy machinery when sleepy. Patient verbalized understanding.     #NARCOLEPSY without cataplexy   -MSLT on 7/31/2024 revealed a mean SL of 6.1 min with 2 SOREMPs.   -Continue modafinil 100 mg once daily and may take another dose in the afternoon as needed for breakthrough sleepiness.  Do not take past 3 PM. Refill submitted   -Discussed side effects of modafinil   -Advised patient to monitor blood pressure once daily and call clinic if BP>160/100 mm Hg.  -OARRS report checked today. No signs of abuse.   -Controlled substance agreement signed today 8/6/24  -Urine drug screen completed 7/31/24- normal  -Discussed safety concerns with the patient including: narcolepsy itself is not deadly, but it may be dangerous if episodes occur during driving, operating machinery, or similar activities.   Possible issues and complications with narcolepsy include difficulty with social activities, injuries and accidents if sleep attacks occur during the activity, and side effects of medications used to treat the disorder.  Supportive management:   Plan naps to control daytime sleepiness and reduce the number of unplanned sudden sleep attacks. Schedule a brief nap (10-15 minutes) in strategic times during the day, if possible.   Inform teachers or work supervisors about the condition.   Maintain a regular sleep-wake schedule and practice good sleep hygiene.   Expose self to bright  light in daytime.   Exercise daily in daytime as it can beneficial and STIMULATING.   Avoid alcohol and sedative-hypnotic drugs as these substances may exacerbate sleepiness.   Avoid driving vehicle or operating heavy machinery when sleepy until daytime sleepiness is well-treated. A person driving while sleepy is 5 times more likely to have an accident. If you feel sleepy, pull over and take a short power nap (sleep for less than 30 minutes). Otherwise, ask somebody to drive you.     #Obesity  BMI Readings from Last 1 Encounters:   01/13/25 40.77 kg/m²     - Encouraged healthy weight loss via diet and exercise  - Weight loss can help in the long term treatment of MASON.  - Defer management to PCP     All of patient's questions were answered. He verbalizes understanding and agreement with my assessment and plan.    Disposition    Return to clinic in 3 months

## 2025-03-17 ENCOUNTER — APPOINTMENT (OUTPATIENT)
Facility: CLINIC | Age: 50
End: 2025-03-17
Payer: COMMERCIAL

## 2025-03-19 DIAGNOSIS — F32.0 MAJOR DEPRESSIVE DISORDER, SINGLE EPISODE, MILD (CMS-HCC): ICD-10-CM

## 2025-03-19 NOTE — TELEPHONE ENCOUNTER
Rx Refill Request     Name: Jernoimo NGO Amparo  :  1975     Date of last appointment:  2025   Date of next appointment:  4/15/2025   Best number to reach patient:  [phone]

## 2025-03-20 RX ORDER — CITALOPRAM 20 MG/1
20 TABLET, FILM COATED ORAL DAILY
Qty: 30 TABLET | Refills: 3 | Status: SHIPPED | OUTPATIENT
Start: 2025-03-20

## 2025-03-22 DIAGNOSIS — E34.9 HYPOTESTOSTERONEMIA: ICD-10-CM

## 2025-03-24 RX ORDER — TESTOSTERONE CYPIONATE 200 MG/ML
INJECTION, SOLUTION INTRAMUSCULAR
Qty: 2 ML | Refills: 1 | Status: SHIPPED | OUTPATIENT
Start: 2025-03-24

## 2025-03-24 NOTE — TELEPHONE ENCOUNTER
Rx Refill Request     Name: Jeronimo Christensen  :  1975   Medication Name:  testosterone cypionate (Depo-Testosterone) 200 mg/mL injection   Specific Pharmacy location:  [address]   Date of last appointment:  2025   Date of next appointment:  4/15/2025   Best number to reach patient:  [phone]

## 2025-03-28 NOTE — PROGRESS NOTES
"Subjective   Patient ID: Jeronimo Christensen is a 50 y.o. male who presents for Depression.  HPI  Depressed mood stable  No suicidal ideation no psychotic or manic symptoms    Obese recommend weight loss and diet  Low testosterone recommend blood work consider supplement    Chronic sinus issues  He declines testing  Would like medicine  No progression or worsening of fevers    Due for colon cancer screening    Narcolepsy stable treatment helpful    Due for screening for hyperlipidemia  Review of Systems   Constitutional:  Negative for activity change and fatigue.   HENT:  Negative for congestion and sore throat.    Eyes:  Negative for discharge.   Respiratory:  Negative for cough, chest tightness and shortness of breath.    Cardiovascular:  Negative for chest pain and leg swelling.   Gastrointestinal:  Negative for abdominal pain, blood in stool, constipation, diarrhea, nausea and vomiting.   Endocrine: Negative for cold intolerance and heat intolerance.   Genitourinary:  Negative for difficulty urinating and hematuria.   Musculoskeletal:  Negative for arthralgias, back pain, gait problem, myalgias and neck pain.   Allergic/Immunologic: Negative for environmental allergies.   Neurological:  Negative for dizziness, syncope, weakness, numbness and headaches.   Hematological:  Negative for adenopathy. Does not bruise/bleed easily.   Psychiatric/Behavioral:  Negative for dysphoric mood. The patient is not nervous/anxious.    All other systems reviewed and are negative.      Objective   /82 (BP Location: Right arm, BP Cuff Size: Large adult)   Pulse 79   Ht 1.676 m (5' 6\")   Wt 114 kg (250 lb 6.4 oz)   SpO2 96%   BMI 40.42 kg/m²    Physical Exam  Vitals and nursing note reviewed.   Constitutional:       General: He is not in acute distress.     Appearance: Normal appearance. He is obese.   HENT:      Head: Normocephalic and atraumatic.      Right Ear: Tympanic membrane, ear canal and external ear normal.      " Left Ear: Tympanic membrane, ear canal and external ear normal.      Nose: Nose normal.      Mouth/Throat:      Mouth: Mucous membranes are moist.      Pharynx: Oropharynx is clear. No oropharyngeal exudate or posterior oropharyngeal erythema.   Eyes:      Extraocular Movements: Extraocular movements intact.      Conjunctiva/sclera: Conjunctivae normal.      Pupils: Pupils are equal, round, and reactive to light.   Cardiovascular:      Rate and Rhythm: Normal rate and regular rhythm.      Pulses: Normal pulses.      Heart sounds: Normal heart sounds. No murmur heard.  Pulmonary:      Effort: Pulmonary effort is normal. No respiratory distress.      Breath sounds: Normal breath sounds. No wheezing or rales.   Abdominal:      General: Abdomen is flat. Bowel sounds are normal. There is no distension.      Palpations: Abdomen is soft. There is no mass.      Tenderness: There is no abdominal tenderness.   Musculoskeletal:         General: No swelling or deformity. Normal range of motion.      Cervical back: Normal range of motion and neck supple.      Right lower leg: No edema.      Left lower leg: No edema.   Lymphadenopathy:      Cervical: No cervical adenopathy.   Skin:     General: Skin is warm and dry.      Capillary Refill: Capillary refill takes less than 2 seconds.      Findings: No lesion or rash.   Neurological:      General: No focal deficit present.      Mental Status: He is alert and oriented to person, place, and time.      Cranial Nerves: No cranial nerve deficit.      Motor: No weakness.   Psychiatric:         Mood and Affect: Mood normal.         Behavior: Behavior normal.         Thought Content: Thought content normal.         Judgment: Judgment normal.         Assessment/Plan   Problem List Items Addressed This Visit       Mild episode of recurrent major depressive disorder (CMS-[facility])    Hypotestosteronemia    Relevant Orders    Testosterone,Free and Total    Primary narcolepsy without cataplexy  ([facility])    Relevant Orders    CBC and Auto Differential    Comprehensive Metabolic Panel    Testosterone,Free and Total    Class 3 severe obesity due to excess calories with serious comorbidity and body mass index (BMI) of 40.0 to 44.9 in adult     Other Visit Diagnoses         Major depressive disorder, single episode, mild (CMS-HCC)    -  Primary    Relevant Orders    Follow Up In [facility] - PCP      Acute non-recurrent maxillary sinusitis        Relevant Medications    azithromycin (Zithromax) 250 mg tablet      Colon cancer screening        Relevant Orders    Cologuard® colon cancer screening      Screening for hyperlipidemia        Relevant Orders    Lipid Panel            Patient education provided.  Stay current with age appropriate health maintenance as instructed.  Appointment here or ER with new or worsening symptoms'  Keep appropriate follow-up visit.  Stay current with proper immunizations   Weight loss and diet  Meds as above  Testing as above  Appointment symptoms persist or worsen  3-month recheck  Report suicidal ideation report psychotic or manic symptoms

## 2025-03-31 ENCOUNTER — APPOINTMENT (OUTPATIENT)
Facility: CLINIC | Age: 50
End: 2025-03-31
Payer: COMMERCIAL

## 2025-03-31 DIAGNOSIS — G47.419 PRIMARY NARCOLEPSY WITHOUT CATAPLEXY (HHS-HCC): Primary | ICD-10-CM

## 2025-03-31 DIAGNOSIS — G47.33 OSA (OBSTRUCTIVE SLEEP APNEA): ICD-10-CM

## 2025-03-31 DIAGNOSIS — Z79.899 MEDICATION MANAGEMENT: ICD-10-CM

## 2025-03-31 PROCEDURE — 1036F TOBACCO NON-USER: CPT | Performed by: NURSE PRACTITIONER

## 2025-03-31 PROCEDURE — 99214 OFFICE O/P EST MOD 30 MIN: CPT | Performed by: NURSE PRACTITIONER

## 2025-03-31 PROCEDURE — 99214 OFFICE O/P EST MOD 30 MIN: CPT | Mod: 95 | Performed by: NURSE PRACTITIONER

## 2025-03-31 RX ORDER — MODAFINIL 100 MG/1
100 TABLET ORAL 2 TIMES DAILY PRN
Qty: 60 TABLET | Refills: 0 | Status: SHIPPED | OUTPATIENT
Start: 2025-03-31 | End: 2025-04-30

## 2025-03-31 ASSESSMENT — SLEEP AND FATIGUE QUESTIONNAIRES
HOW LIKELY ARE YOU TO NOD OFF OR FALL ASLEEP IN A CAR, WHILE STOPPED FOR A FEW MINUTES IN TRAFFIC: WOULD NEVER DOZE
HOW LIKELY ARE YOU TO NOD OFF OR FALL ASLEEP WHILE SITTING QUIETLY AFTER LUNCH WITHOUT ALCOHOL: SLIGHT CHANCE OF DOZING
SATISFACTION_WITH_CURRENT_SLEEP_PATTERN: VERY SATISFIED
WORRIED_DISTRESSED_DUE_TO_SLEEP: A LITTLE
SITING INACTIVE IN A PUBLIC PLACE LIKE A CLASS ROOM OR A MOVIE THEATER: SLIGHT CHANCE OF DOZING
DIFFICULTY_FALLING_ASLEEP: MODERATE
HOW LIKELY ARE YOU TO NOD OFF OR FALL ASLEEP WHILE SITTING AND TALKING TO SOMEONE: SLIGHT CHANCE OF DOZING
HOW LIKELY ARE YOU TO NOD OFF OR FALL ASLEEP WHILE WATCHING TV: SLIGHT CHANCE OF DOZING
WAKING_TOO_EARLY: MILD
SLEEP_PROBLEM_INTERFERES_DAILY_ACTIVITIES: A LITTLE
SLEEP_PROBLEM_NOTICEABLE_TO_OTHERS: SOMEWHAT
HOW LIKELY ARE YOU TO NOD OFF OR FALL ASLEEP WHILE SITTING AND READING: SLIGHT CHANCE OF DOZING
HOW LIKELY ARE YOU TO NOD OFF OR FALL ASLEEP WHILE LYING DOWN TO REST IN THE AFTERNOON WHEN CIRCUMSTANCES PERMIT: HIGH CHANCE OF DOZING
HOW LIKELY ARE YOU TO NOD OFF OR FALL ASLEEP WHEN YOU ARE A PASSENGER IN A CAR FOR AN HOUR WITHOUT A BREAK: SLIGHT CHANCE OF DOZING
ESS-CHAD TOTAL SCORE: 9

## 2025-04-15 ENCOUNTER — APPOINTMENT (OUTPATIENT)
Dept: PRIMARY CARE | Facility: CLINIC | Age: 50
End: 2025-04-15
Payer: COMMERCIAL

## 2025-04-15 VITALS
HEIGHT: 66 IN | BODY MASS INDEX: 40.24 KG/M2 | WEIGHT: 250.4 LBS | SYSTOLIC BLOOD PRESSURE: 136 MMHG | DIASTOLIC BLOOD PRESSURE: 82 MMHG | OXYGEN SATURATION: 96 % | HEART RATE: 79 BPM

## 2025-04-15 DIAGNOSIS — G47.419 PRIMARY NARCOLEPSY WITHOUT CATAPLEXY (HHS-HCC): ICD-10-CM

## 2025-04-15 DIAGNOSIS — F33.0 MILD EPISODE OF RECURRENT MAJOR DEPRESSIVE DISORDER (CMS-HCC): ICD-10-CM

## 2025-04-15 DIAGNOSIS — E34.9 HYPOTESTOSTERONEMIA: ICD-10-CM

## 2025-04-15 DIAGNOSIS — E66.01 CLASS 3 SEVERE OBESITY DUE TO EXCESS CALORIES WITH SERIOUS COMORBIDITY AND BODY MASS INDEX (BMI) OF 40.0 TO 44.9 IN ADULT: ICD-10-CM

## 2025-04-15 DIAGNOSIS — E66.813 CLASS 3 SEVERE OBESITY DUE TO EXCESS CALORIES WITH SERIOUS COMORBIDITY AND BODY MASS INDEX (BMI) OF 40.0 TO 44.9 IN ADULT: ICD-10-CM

## 2025-04-15 DIAGNOSIS — Z13.220 SCREENING FOR HYPERLIPIDEMIA: ICD-10-CM

## 2025-04-15 DIAGNOSIS — J01.00 ACUTE NON-RECURRENT MAXILLARY SINUSITIS: ICD-10-CM

## 2025-04-15 DIAGNOSIS — F32.0 MAJOR DEPRESSIVE DISORDER, SINGLE EPISODE, MILD (CMS-HCC): Primary | ICD-10-CM

## 2025-04-15 DIAGNOSIS — Z12.11 COLON CANCER SCREENING: ICD-10-CM

## 2025-04-15 PROCEDURE — 99214 OFFICE O/P EST MOD 30 MIN: CPT | Performed by: FAMILY MEDICINE

## 2025-04-15 PROCEDURE — 3008F BODY MASS INDEX DOCD: CPT | Performed by: FAMILY MEDICINE

## 2025-04-15 RX ORDER — AZITHROMYCIN 250 MG/1
TABLET, FILM COATED ORAL
Qty: 6 TABLET | Refills: 0 | Status: SHIPPED | OUTPATIENT
Start: 2025-04-15 | End: 2025-04-20

## 2025-04-15 ASSESSMENT — ENCOUNTER SYMPTOMS
BACK PAIN: 0
COUGH: 0
DIARRHEA: 0
FATIGUE: 0
ADENOPATHY: 0
SORE THROAT: 0
SHORTNESS OF BREATH: 0
CONSTIPATION: 0
DIFFICULTY URINATING: 0
EYE DISCHARGE: 0
DYSPHORIC MOOD: 0
ACTIVITY CHANGE: 0
BLOOD IN STOOL: 0
ARTHRALGIAS: 0
NECK PAIN: 0
NAUSEA: 0
WEAKNESS: 0
HEADACHES: 0
NERVOUS/ANXIOUS: 0
DIZZINESS: 0
BRUISES/BLEEDS EASILY: 0
VOMITING: 0
ABDOMINAL PAIN: 0
MYALGIAS: 0
CHEST TIGHTNESS: 0
HEMATURIA: 0
NUMBNESS: 0

## 2025-04-27 LAB
ALBUMIN SERPL-MCNC: 4.8 G/DL (ref 3.6–5.1)
ALP SERPL-CCNC: 89 U/L (ref 36–130)
ALT SERPL-CCNC: 84 U/L (ref 9–46)
ANION GAP SERPL CALCULATED.4IONS-SCNC: 14 MMOL/L (CALC) (ref 7–17)
AST SERPL-CCNC: 39 U/L (ref 10–40)
BASOPHILS # BLD AUTO: 39 CELLS/UL (ref 0–200)
BASOPHILS NFR BLD AUTO: 0.8 %
BILIRUB SERPL-MCNC: 0.8 MG/DL (ref 0.2–1.2)
BUN SERPL-MCNC: 16 MG/DL (ref 7–25)
CALCIUM SERPL-MCNC: 9.6 MG/DL (ref 8.6–10.3)
CHLORIDE SERPL-SCNC: 97 MMOL/L (ref 98–110)
CHOLEST SERPL-MCNC: 235 MG/DL
CHOLEST/HDLC SERPL: 5.3 (CALC)
CO2 SERPL-SCNC: 28 MMOL/L (ref 20–32)
CREAT SERPL-MCNC: 1.18 MG/DL (ref 0.6–1.29)
EGFRCR SERPLBLD CKD-EPI 2021: 76 ML/MIN/1.73M2
EOSINOPHIL # BLD AUTO: 39 CELLS/UL (ref 15–500)
EOSINOPHIL NFR BLD AUTO: 0.8 %
ERYTHROCYTE [DISTWIDTH] IN BLOOD BY AUTOMATED COUNT: 13.3 % (ref 11–15)
GLUCOSE SERPL-MCNC: 90 MG/DL (ref 65–99)
HCT VFR BLD AUTO: 56 % (ref 38.5–50)
HDLC SERPL-MCNC: 44 MG/DL
HGB BLD-MCNC: 19.1 G/DL (ref 13.2–17.1)
LDLC SERPL CALC-MCNC: 130 MG/DL (CALC)
LYMPHOCYTES # BLD AUTO: 1431 CELLS/UL (ref 850–3900)
LYMPHOCYTES NFR BLD AUTO: 29.2 %
MCH RBC QN AUTO: 32 PG (ref 27–33)
MCHC RBC AUTO-ENTMCNC: 34.1 G/DL (ref 32–36)
MCV RBC AUTO: 93.8 FL (ref 80–100)
MONOCYTES # BLD AUTO: 456 CELLS/UL (ref 200–950)
MONOCYTES NFR BLD AUTO: 9.3 %
NEUTROPHILS # BLD AUTO: 2935 CELLS/UL (ref 1500–7800)
NEUTROPHILS NFR BLD AUTO: 59.9 %
NONHDLC SERPL-MCNC: 191 MG/DL (CALC)
PLATELET # BLD AUTO: 177 THOUSAND/UL (ref 140–400)
PMV BLD REES-ECKER: 10.2 FL (ref 7.5–12.5)
POTASSIUM SERPL-SCNC: 4.6 MMOL/L (ref 3.5–5.3)
PROT SERPL-MCNC: 7 G/DL (ref 6.1–8.1)
RBC # BLD AUTO: 5.97 MILLION/UL (ref 4.2–5.8)
SODIUM SERPL-SCNC: 139 MMOL/L (ref 135–146)
TESTOST FREE SERPL-MCNC: NORMAL PG/ML
TESTOST SERPL-MCNC: NORMAL NG/DL
TRIGL SERPL-MCNC: 398 MG/DL
WBC # BLD AUTO: 4.9 THOUSAND/UL (ref 3.8–10.8)

## 2025-04-30 ENCOUNTER — OFFICE VISIT (OUTPATIENT)
Facility: CLINIC | Age: 50
End: 2025-04-30
Payer: COMMERCIAL

## 2025-04-30 DIAGNOSIS — G47.33 OSA (OBSTRUCTIVE SLEEP APNEA): ICD-10-CM

## 2025-04-30 DIAGNOSIS — G47.419 PRIMARY NARCOLEPSY WITHOUT CATAPLEXY (HHS-HCC): Primary | ICD-10-CM

## 2025-04-30 PROCEDURE — 99214 OFFICE O/P EST MOD 30 MIN: CPT | Performed by: NURSE PRACTITIONER

## 2025-04-30 PROCEDURE — 1036F TOBACCO NON-USER: CPT | Performed by: NURSE PRACTITIONER

## 2025-04-30 PROCEDURE — 99214 OFFICE O/P EST MOD 30 MIN: CPT | Mod: 95 | Performed by: NURSE PRACTITIONER

## 2025-04-30 ASSESSMENT — SLEEP AND FATIGUE QUESTIONNAIRES
HOW LIKELY ARE YOU TO NOD OFF OR FALL ASLEEP WHILE SITTING AND READING: MODERATE CHANCE OF DOZING
ESS-CHAD TOTAL SCORE: 14
HOW LIKELY ARE YOU TO NOD OFF OR FALL ASLEEP WHILE LYING DOWN TO REST IN THE AFTERNOON WHEN CIRCUMSTANCES PERMIT: HIGH CHANCE OF DOZING
HOW LIKELY ARE YOU TO NOD OFF OR FALL ASLEEP IN A CAR, WHILE STOPPED FOR A FEW MINUTES IN TRAFFIC: WOULD NEVER DOZE
HOW LIKELY ARE YOU TO NOD OFF OR FALL ASLEEP WHILE WATCHING TV: MODERATE CHANCE OF DOZING
HOW LIKELY ARE YOU TO NOD OFF OR FALL ASLEEP WHILE SITTING QUIETLY AFTER LUNCH WITHOUT ALCOHOL: MODERATE CHANCE OF DOZING
SITING INACTIVE IN A PUBLIC PLACE LIKE A CLASS ROOM OR A MOVIE THEATER: MODERATE CHANCE OF DOZING
HOW LIKELY ARE YOU TO NOD OFF OR FALL ASLEEP WHEN YOU ARE A PASSENGER IN A CAR FOR AN HOUR WITHOUT A BREAK: MODERATE CHANCE OF DOZING
HOW LIKELY ARE YOU TO NOD OFF OR FALL ASLEEP WHILE SITTING AND TALKING TO SOMEONE: SLIGHT CHANCE OF DOZING

## 2025-04-30 ASSESSMENT — PATIENT HEALTH QUESTIONNAIRE - PHQ9
1. LITTLE INTEREST OR PLEASURE IN DOING THINGS: NOT AT ALL
SUM OF ALL RESPONSES TO PHQ9 QUESTIONS 1 AND 2: 0
2. FEELING DOWN, DEPRESSED OR HOPELESS: NOT AT ALL

## 2025-04-30 NOTE — PROGRESS NOTES
Patient: Jeronimo Christensen  : 1975 AGE: 50 y.o. SEX:male   MRN: [id number]   Provider: DUSTIN Mcleod-CNP     Location [facility]   Service Date: 2025     PCP: Ivan Adams MD   Referred by: No ref. provider found          [facility]  Follow Up Visit Note  Virtual or Telephone Consent  An interactive audio and video telecommunication system which permits real time communications between the patient (at the originating site) and provider (at the distant site) was utilized to provide this telehealth service.   Verbal consent was requested and obtained from Jeronimo Christensen on this date, 25 for a telehealth visit.      HISTORY OF PRESENT ILLNESS     Jeronimo Christensen is a 50 y.o. male with a h/o MASON, Obesity, and Depression who presents to [facility] for follow up.     SLEEP STUDY HISTORY (personally reviewed raw data such as interpretation report, data sheet, hypnogram, and titration table if available and applicable)  - HSAT 2023-showing severe MASON with KAMI 3% 65.7 KAMI 4% 52.7, SpO2 gaye 75%.  -PAP titration 10/27/2023-successful titration with CPAP at 15 cm H2O in which sleep disordered breathing, sleep-related hypoxemia, and snoring were resolved.  Optimal mask was ResMed AirFit P30 I nasal pillow size medium.  - PSG 24 on CPAP- RDI 3% 8.5, RDI 4% 1.4, SpO2 gaye 91% on CPAP 15-18CWP. PLM index of 68.6/hr. Clinical correlation suggested. MSLT performed next day.  - MSLT 24- revealed a mean SL of 6.1 min with 2 SOREMPs consistent with diagnosis of narcolepsy. Recommend working on consistent sufficient sleep duration.       SLEEP HISTORY:    25: Last 2 weeks with more EDS despite use of modafinil 100mg at 5:30AM, this is only lasting about 2 hours and he is taking his second dose by 7:30AM to be able to stay awake throughout morning. He has had significant increase in sleep  disturbances and early morning awakenings. Using CPAP nightly without issue. ----> Start Xywav         3/31/25: Wife reports some breakthrough snoring on PAP. Last week had 3 days of waking too early before his alarm clock. Average TST 7hrs/night. Narcolepsy pretty well managed on current meds. Takes modafinil 100mg around 6:30AM. Does not usually need second breakthrough dosage.       12/16/24: TST 7.5 hours/night. No issues with CPAP, using every night with positive benefits. PERCEIVED BENEFITS OF PAP: decreased or no snoring decreased nocturnal awakenings sleeping for a longer duration of time better sleep quality.  Denies any bothersome symptoms or air leak.  Comfortable with P10 mask and pressure setting. Narcolepsy well controlled on modafinil, most days taking 100mg around 6:30AM. Some days takes second dose. Feels slightly more antsy with medication but no other side effects, benefiting in reducing EDS/sleep attacks. Depression meds recently adjusted by PCP; currently on Celexa at night, wellbutrin in AM. Has appointment next month w psychiatry to establish care. --> supplies ordered     9/17/24: Narcolepsy/MASON follow up. Reports 75% improvement since starting modafinil. Takes 1 tab (100mg) at 5:30AM. Denies any significant medication side effects. Some days feels slightly more anxious but not problem causing. ESS 19 last visit, ESS 1 today.  Now sleeping better. No more prolonged awakenings. Sleep schedule 10PM to 5:15AM. Using CPAP every night, comfortable with current pressure and P10 mask fit.  Denies any bothersome symptoms.  Needs replacement supplies. ----> ordered    8/6/24: Here to review MSLT results. ESS 19 today. Sleep schedule 9:45PM-5:15AM on weekdays and 11:15PM to 7AM on weekends. Naps on the weekends for 2 hours which is refreshing. Has been waking 1x/night for prolonged period (2 hours) since restarting Celexa after sleep study completed. Also going through life stressors with his mother  being ill and starting with hospice care. ----> start Modafinil 100mg BID PRN     6/27/24: NPV, referred by neurology- Dr. Romero with concerns of sleep attacks. Patient was dx with MASON about 8 yrs ago and has been on CPAP since. Most recent sleep study in 2023 showed severe MASON.  Currently on CPAP 15 cm H2O with EPR/Flex 3 and AirFit P10 NP size medium mask through [employer]. Patient has been using machine every night. Patient denies machine problems, mask leak, air hunger, aerophagia, dry mouth, skin irritation, and nasal congestion. The following are patient's perceived benefits of PAP: no snoring on PAP, decreased daytime sleepiness and/or fatigue, sleeps faster at bedtime, decreased nocturnal awakening, and better quality of sleep. Despite using PAP every night, he is concerned with his EDS/sleep attacks. Started around age 21, when he remembers falling asleep during basic training for the [employer]. He describes episodes of sleep attacks while at work, watching television, watching a movie, or being at a concert. He does sleep about 6.5- 7 hours/night. He uses a CPAP machine for MASON. He notes that there are times where he is falling asleep and dreams right away. He denies any sudden muscle weakness provoked by high emotion. Reports some sleep paralysis and hypnagogic hallucinations.      ESS: 14      REVIEW OF SYSTEMS     All other systems have been reviewed and are negative.    ALLERGIES     Allergies   Allergen Reactions    Cephalexin Unknown       MEDICATIONS     Current Outpatient Medications   Medication Sig Dispense Refill    buPROPion XL (Wellbutrin XL) 150 mg 24 hr tablet Take 1 tablet (150 mg) by mouth once daily in the morning. Do not crush, chew, or split. 30 tablet 5    citalopram (CeleXA) 20 mg tablet TAKE 1 TABLET BY MOUTH EVERY DAY 30 tablet 3    modafinil (Provigil) 100 mg tablet Take 1 tablet (100 mg) by mouth 2 times a day as needed (sleepiness). 60 tablet 0    syringe with  "needle 3 mL 22 x 1 1/2\" syringe USE EVERY TWO WEEKS TO INJECT TESTOSTERONE. 50 each 1    testosterone cypionate (Depo-Testosterone) 200 mg/mL injection INJECT 1 ML INTRAMUSCULARLY EVERY 2 WEEKS 2 mL 1     No current facility-administered medications for this visit.       PAST HISTORIES     PERTINENT PAST MEDICAL HISTORY: See HPI    PERTINENT PAST SURGICAL HISTORY for Sleep Medicine:  non-contributory    PERTINENT FAMILY HISTORY for Sleep Medicine:  sleep apnea on PAP- mother, father, brothers x 2     PERTINENT SOCIAL HISTORY:  He  reports that he has never smoked. He has never been exposed to tobacco smoke. He has never used smokeless tobacco. He reports that he does not currently use alcohol. He reports that he does not use drugs. 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     Active Problems, Allergy List, Medication List, and PMH/PSH/FH/Social Hx have been reviewed and reconciled in chart. No significant changes unless documented in the pertinent chart section. Updates made when necessary.     PHYSICAL EXAM     VITAL SIGNS: There were no vitals taken for this visit.    CURRENT WEIGHT:   There were no vitals filed for this visit.    PREVIOUS WEIGHTS:  Wt Readings from Last 3 Encounters:   04/15/25 114 kg (250 lb 6.4 oz)   01/13/25 115 kg (252 lb 9.6 oz)   11/15/24 114 kg (252 lb 3.2 oz)     Limited telehealth examination  PHYSICAL EXAMINATION  General appearance: awake alert in NAD  Affect: normal  HEENT: Nasal congestion absent  Lungs: no cough.  Neuro: normal speech      RESULTS/DATA     No results found for: \"IRON\", \"TRANSFERRIN\", \"IRONSAT\", \"TIBC\", \"FERRITIN\"    CARBON DIOXIDE   Date Value Ref Range Status   04/26/2025 28 20 - 32 mmol/L Final       ASSESSMENT/PLAN     Mr. Christensen is a 50 y.o. male and He was referred to the [facility] for evaluation of MASON and Narcolepsy     Problem List, Orders, Assessment, Recommendations:    #MASON, severe  - HSAT " 9/28/2023-showing severe MASON with KAMI 3% 65.7 KAMI 4% 52.7, SpO2 gaye 75%.  - PAP titration 10/27/2023-successful titration with CPAP at 15 cm H2O in which sleep disordered breathing, sleep-related hypoxemia, and snoring were resolved.  Optimal mask was ResMed AirFit P30 I nasal pillow size medium.  - Retrieved and personally reviewed recent PAP adherence download data today. See HPI.  - excellent compliance to PAP therapy, residual AHI slightly at goal (5.2)  - Continue current setting CPAP 17 CWP  - DME- [facility] (P10)   - diet, exercise, and weight loss were emphasized today in clinic, as were non-supine sleep, avoiding alcohol in the late evening, and driving or operating heavy machinery when sleepy. Patient verbalized understanding.     #NARCOLEPSY without cataplexy   -MSLT on 7/31/2024 revealed a mean SL of 6.1 min with 2 SOREMPs.   -Continue modafinil 100 mg once daily and may take another dose in the afternoon as needed for breakthrough sleepiness.  Do not take past 3 PM   - EDS with ESS 14 today despite modafinil 2x/day. More   - Due to sleep disturbances and night time awakenings; Will start Xywav as this will be the best option in order to improve nighttime sleep consolidation   -Discussed side effects of Xywav; patient education sent through [employer]   -Advised patient to monitor blood pressure once daily and call clinic if BP>160/100 mm Hg.  -OARRS report checked today. No signs of abuse.   -Controlled substance agreement signed today 8/6/24  -Urine drug screen completed 7/31/24- normal  -Discussed safety concerns with the patient including: narcolepsy itself is not deadly, but it may be dangerous if episodes occur during driving, operating machinery, or similar activities.   Possible issues and complications with narcolepsy include difficulty with social activities, injuries and accidents if sleep attacks occur during the activity, and side effects of medications used to treat the disorder.  Supportive  management:   Plan naps to control daytime sleepiness and reduce the number of unplanned sudden sleep attacks. Schedule a brief nap (10-15 minutes) in strategic times during the day, if possible.   Inform teachers or work supervisors about the condition.   Maintain a regular sleep-wake schedule and practice good sleep hygiene.   Expose self to bright light in daytime.   Exercise daily in daytime as it can beneficial and STIMULATING.   Avoid alcohol and sedative-hypnotic drugs as these substances may exacerbate sleepiness.   Avoid driving vehicle or operating heavy machinery when sleepy until daytime sleepiness is well-treated. A person driving while sleepy is 5 times more likely to have an accident. If you feel sleepy, pull over and take a short power nap (sleep for less than 30 minutes). Otherwise, ask somebody to drive you.     #Obesity  BMI Readings from Last 1 Encounters:   04/15/25 40.42 kg/m²     - Encouraged healthy weight loss via diet and exercise  - Weight loss can help in the long term treatment of MASON.  - Defer management to PCP     All of patient's questions were answered. He verbalizes understanding and agreement with my assessment and plan.    Disposition    Return to clinic in 1 month

## 2025-05-02 ENCOUNTER — PATIENT MESSAGE (OUTPATIENT)
Dept: SLEEP MEDICINE | Facility: HOSPITAL | Age: 50
End: 2025-05-02

## 2025-05-02 LAB
ALBUMIN SERPL-MCNC: 4.8 G/DL (ref 3.6–5.1)
ALP SERPL-CCNC: 89 U/L (ref 36–130)
ALT SERPL-CCNC: 84 U/L (ref 9–46)
ANION GAP SERPL CALCULATED.4IONS-SCNC: 14 MMOL/L (CALC) (ref 7–17)
AST SERPL-CCNC: 39 U/L (ref 10–40)
BASOPHILS # BLD AUTO: 39 CELLS/UL (ref 0–200)
BASOPHILS NFR BLD AUTO: 0.8 %
BILIRUB SERPL-MCNC: 0.8 MG/DL (ref 0.2–1.2)
BUN SERPL-MCNC: 16 MG/DL (ref 7–25)
CALCIUM SERPL-MCNC: 9.6 MG/DL (ref 8.6–10.3)
CHLORIDE SERPL-SCNC: 97 MMOL/L (ref 98–110)
CHOLEST SERPL-MCNC: 235 MG/DL
CHOLEST/HDLC SERPL: 5.3 (CALC)
CO2 SERPL-SCNC: 28 MMOL/L (ref 20–32)
CREAT SERPL-MCNC: 1.18 MG/DL (ref 0.6–1.29)
EGFRCR SERPLBLD CKD-EPI 2021: 76 ML/MIN/1.73M2
EOSINOPHIL # BLD AUTO: 39 CELLS/UL (ref 15–500)
EOSINOPHIL NFR BLD AUTO: 0.8 %
ERYTHROCYTE [DISTWIDTH] IN BLOOD BY AUTOMATED COUNT: 13.3 % (ref 11–15)
GLUCOSE SERPL-MCNC: 90 MG/DL (ref 65–99)
HCT VFR BLD AUTO: 56 % (ref 38.5–50)
HDLC SERPL-MCNC: 44 MG/DL
HGB BLD-MCNC: 19.1 G/DL (ref 13.2–17.1)
LDLC SERPL CALC-MCNC: 130 MG/DL (CALC)
LYMPHOCYTES # BLD AUTO: 1431 CELLS/UL (ref 850–3900)
LYMPHOCYTES NFR BLD AUTO: 29.2 %
MCH RBC QN AUTO: 32 PG (ref 27–33)
MCHC RBC AUTO-ENTMCNC: 34.1 G/DL (ref 32–36)
MCV RBC AUTO: 93.8 FL (ref 80–100)
MONOCYTES # BLD AUTO: 456 CELLS/UL (ref 200–950)
MONOCYTES NFR BLD AUTO: 9.3 %
NEUTROPHILS # BLD AUTO: 2935 CELLS/UL (ref 1500–7800)
NEUTROPHILS NFR BLD AUTO: 59.9 %
NONHDLC SERPL-MCNC: 191 MG/DL (CALC)
PLATELET # BLD AUTO: 177 THOUSAND/UL (ref 140–400)
PMV BLD REES-ECKER: 10.2 FL (ref 7.5–12.5)
POTASSIUM SERPL-SCNC: 4.6 MMOL/L (ref 3.5–5.3)
PROT SERPL-MCNC: 7 G/DL (ref 6.1–8.1)
RBC # BLD AUTO: 5.97 MILLION/UL (ref 4.2–5.8)
SODIUM SERPL-SCNC: 139 MMOL/L (ref 135–146)
TESTOST FREE SERPL-MCNC: 9.2 PG/ML (ref 35–155)
TESTOST SERPL-MCNC: 66 NG/DL (ref 250–1100)
TRIGL SERPL-MCNC: 398 MG/DL
WBC # BLD AUTO: 4.9 THOUSAND/UL (ref 3.8–10.8)

## 2025-05-05 NOTE — PATIENT COMMUNICATION
"[address] [place] [employer] [place] FOR XYWAV     submitted      Denied due to pt not trying all alternatives \"Other drugs that can be used are modafinil(TRIED), armodafinil, Sodium Oxybate oral solution, Wakix, Sunosi, Lumryz\"      Faxed appeal on 5/9 to janes  "

## 2025-05-09 LAB — NONINV COLON CA DNA+OCC BLD SCRN STL QL: NORMAL

## 2025-05-20 DIAGNOSIS — G47.419 PRIMARY NARCOLEPSY WITHOUT CATAPLEXY (HHS-HCC): Primary | ICD-10-CM

## 2025-05-20 RX ORDER — MODAFINIL 100 MG/1
100 TABLET ORAL 2 TIMES DAILY PRN
Qty: 60 TABLET | Refills: 0 | Status: SHIPPED | OUTPATIENT
Start: 2025-05-20 | End: 2025-06-19

## 2025-05-30 LAB — NONINV COLON CA DNA+OCC BLD SCRN STL QL: NEGATIVE

## 2025-06-22 DIAGNOSIS — F32.0 MAJOR DEPRESSIVE DISORDER, SINGLE EPISODE, MILD: ICD-10-CM

## 2025-06-23 RX ORDER — CITALOPRAM 20 MG/1
20 TABLET ORAL DAILY
Qty: 90 TABLET | Refills: 1 | Status: SHIPPED | OUTPATIENT
Start: 2025-06-23

## 2025-06-23 NOTE — TELEPHONE ENCOUNTER
Rx Refill Request     Name: Jeronimo NGO Amparo  :  1975   Date of last appointment:  4/15/2025   Date of next appointment:  10/16/2025   Best number to reach patient:  [phone]

## 2025-06-30 ENCOUNTER — APPOINTMENT (OUTPATIENT)
Facility: CLINIC | Age: 50
End: 2025-06-30
Payer: COMMERCIAL

## 2025-07-21 ENCOUNTER — OFFICE VISIT (OUTPATIENT)
Facility: CLINIC | Age: 50
End: 2025-07-21
Payer: COMMERCIAL

## 2025-07-21 DIAGNOSIS — Z78.9 NONSMOKER: ICD-10-CM

## 2025-07-21 DIAGNOSIS — G47.419 PRIMARY NARCOLEPSY WITHOUT CATAPLEXY (HHS-HCC): Primary | ICD-10-CM

## 2025-07-21 DIAGNOSIS — G47.33 OSA (OBSTRUCTIVE SLEEP APNEA): ICD-10-CM

## 2025-07-21 DIAGNOSIS — Z79.899 MEDICATION MANAGEMENT: ICD-10-CM

## 2025-07-21 DIAGNOSIS — E66.01 MORBID OBESITY WITH BMI OF 40.0-44.9, ADULT (MULTI): ICD-10-CM

## 2025-07-21 PROCEDURE — 1036F TOBACCO NON-USER: CPT | Performed by: NURSE PRACTITIONER

## 2025-07-21 PROCEDURE — 99214 OFFICE O/P EST MOD 30 MIN: CPT | Mod: 95 | Performed by: NURSE PRACTITIONER

## 2025-07-21 PROCEDURE — 99214 OFFICE O/P EST MOD 30 MIN: CPT | Performed by: NURSE PRACTITIONER

## 2025-07-21 ASSESSMENT — SLEEP AND FATIGUE QUESTIONNAIRES
HOW LIKELY ARE YOU TO NOD OFF OR FALL ASLEEP WHILE WATCHING TV: WOULD NEVER DOZE
HOW LIKELY ARE YOU TO NOD OFF OR FALL ASLEEP WHILE SITTING AND READING: WOULD NEVER DOZE
HOW LIKELY ARE YOU TO NOD OFF OR FALL ASLEEP WHEN YOU ARE A PASSENGER IN A CAR FOR AN HOUR WITHOUT A BREAK: WOULD NEVER DOZE
HOW LIKELY ARE YOU TO NOD OFF OR FALL ASLEEP WHILE LYING DOWN TO REST IN THE AFTERNOON WHEN CIRCUMSTANCES PERMIT: SLIGHT CHANCE OF DOZING
ESS-CHAD TOTAL SCORE: 2
SITING INACTIVE IN A PUBLIC PLACE LIKE A CLASS ROOM OR A MOVIE THEATER: SLIGHT CHANCE OF DOZING
HOW LIKELY ARE YOU TO NOD OFF OR FALL ASLEEP WHILE SITTING AND TALKING TO SOMEONE: WOULD NEVER DOZE
HOW LIKELY ARE YOU TO NOD OFF OR FALL ASLEEP WHILE SITTING QUIETLY AFTER LUNCH WITHOUT ALCOHOL: WOULD NEVER DOZE
HOW LIKELY ARE YOU TO NOD OFF OR FALL ASLEEP IN A CAR, WHILE STOPPED FOR A FEW MINUTES IN TRAFFIC: WOULD NEVER DOZE

## 2025-07-21 ASSESSMENT — PATIENT HEALTH QUESTIONNAIRE - PHQ9
2. FEELING DOWN, DEPRESSED OR HOPELESS: NOT AT ALL
SUM OF ALL RESPONSES TO PHQ9 QUESTIONS 1 AND 2: 0
1. LITTLE INTEREST OR PLEASURE IN DOING THINGS: NOT AT ALL

## 2025-07-21 NOTE — PROGRESS NOTES
Patient: Jeronimo Christensen  : 1975 AGE: 50 y.o. SEX:male   MRN: [id number]   Provider: DUSTIN Mcleod-CNP     Location [facility]   Service Date: 2025     PCP: Ivan Adams MD   Referred by: No ref. provider found          [facility]  Follow Up Visit Note  Virtual or Telephone Consent  An interactive audio and video telecommunication system which permits real time communications between the patient (at the originating site) and provider (at the distant site) was utilized to provide this telehealth service.   Verbal consent was requested and obtained from Jeronimo Christensen on this date, 25 for a telehealth visit.      HISTORY OF PRESENT ILLNESS     Jeronimo Christensen is a 50 y.o. male with a h/o MASON, Obesity, and Depression who presents to [facility] for follow up.       SLEEP STUDY HISTORY (personally reviewed raw data such as interpretation report, data sheet, hypnogram, and titration table if available and applicable)  - HSAT 2023-showing severe MASON with KAMI 3% 65.7 KAMI 4% 52.7, SpO2 gaye 75%.  -PAP titration 10/27/2023-successful titration with CPAP at 15 cm H2O in which sleep disordered breathing, sleep-related hypoxemia, and snoring were resolved.  Optimal mask was ResMed AirFit P30 I nasal pillow size medium.  - PSG 24 on CPAP- RDI 3% 8.5, RDI 4% 1.4, SpO2 gaye 91% on CPAP 15-18CWP. PLM index of 68.6/hr. Clinical correlation suggested. MSLT performed next day.  - MSLT 24- revealed a mean SL of 6.1 min with 2 SOREMPs consistent with diagnosis of narcolepsy. Recommend working on consistent sufficient sleep duration.       SLEEP HISTORY:    25: First follow up since starting Xywav, doing excellent and noticing significant benefit of no more sleep attacks/EDS. ESS 2 today. Taking first dose at 9:30PM & no problems waking for second dose at midnight. Denies any medication side  effects. Has now decreased modafinil 100mg to once daily around 5:30AM.  Using CPAP every night. Most comfortable with P30i mask, would like this style with next shipment. ---> UDS, CPAP supplies ordered       4/30/25: Last 2 weeks with more EDS despite use of modafinil 100mg at 5:30AM, this is only lasting about 2 hours and he is taking his second dose by 7:30AM to be able to stay awake throughout morning. He has had significant increase in sleep disturbances and early morning awakenings. Using CPAP nightly without issue. ----> Start Xywav         3/31/25: Wife reports some breakthrough snoring on PAP. Last week had 3 days of waking too early before his alarm clock. Average TST 7hrs/night. Narcolepsy pretty well managed on current meds. Takes modafinil 100mg around 6:30AM. Does not usually need second breakthrough dosage.     12/16/24: TST 7.5 hours/night. No issues with CPAP, using every night with positive benefits. PERCEIVED BENEFITS OF PAP: decreased or no snoring decreased nocturnal awakenings sleeping for a longer duration of time better sleep quality.  Denies any bothersome symptoms or air leak.  Comfortable with P10 mask and pressure setting. Narcolepsy well controlled on modafinil, most days taking 100mg around 6:30AM. Some days takes second dose. Feels slightly more antsy with medication but no other side effects, benefiting in reducing EDS/sleep attacks. Depression meds recently adjusted by PCP; currently on Celexa at night, wellbutrin in AM. Has appointment next month w psychiatry to establish care. --> supplies ordered     9/17/24: Narcolepsy/MASON follow up. Reports 75% improvement since starting modafinil. Takes 1 tab (100mg) at 5:30AM. Denies any significant medication side effects. Some days feels slightly more anxious but not problem causing. ESS 19 last visit, ESS 1 today.  Now sleeping better. No more prolonged awakenings. Sleep schedule 10PM to 5:15AM. Using CPAP every night, comfortable with  current pressure and P10 mask fit.  Denies any bothersome symptoms.  Needs replacement supplies. ----> ordered    8/6/24: Here to review MSLT results. ESS 19 today. Sleep schedule 9:45PM-5:15AM on weekdays and 11:15PM to 7AM on weekends. Naps on the weekends for 2 hours which is refreshing. Has been waking 1x/night for prolonged period (2 hours) since restarting Celexa after sleep study completed. Also going through life stressors with his mother being ill and starting with hospice care. ----> start Modafinil 100mg BID PRN     6/27/24: NPV, referred by neurology- Dr. Romero with concerns of sleep attacks. Patient was dx with MASON about 8 yrs ago and has been on CPAP since. Most recent sleep study in 2023 showed severe MASON.  Currently on CPAP 15 cm H2O with EPR/Flex 3 and AirFit P10 NP size medium mask through [employer]. Patient has been using machine every night. Patient denies machine problems, mask leak, air hunger, aerophagia, dry mouth, skin irritation, and nasal congestion. The following are patient's perceived benefits of PAP: no snoring on PAP, decreased daytime sleepiness and/or fatigue, sleeps faster at bedtime, decreased nocturnal awakening, and better quality of sleep. Despite using PAP every night, he is concerned with his EDS/sleep attacks. Started around age 21, when he remembers falling asleep during basic training for the [employer]. He describes episodes of sleep attacks while at work, watching television, watching a movie, or being at a concert. He does sleep about 6.5- 7 hours/night. He uses a CPAP machine for MASON. He notes that there are times where he is falling asleep and dreams right away. He denies any sudden muscle weakness provoked by high emotion. Reports some sleep paralysis and hypnagogic hallucinations.      ESS: 2      REVIEW OF SYSTEMS     All other systems have been reviewed and are negative.    ALLERGIES     Allergies   Allergen Reactions    Cephalexin Unknown  "      MEDICATIONS     Current Outpatient Medications   Medication Sig Dispense Refill    buPROPion XL (Wellbutrin XL) 150 mg 24 hr tablet Take 1 tablet (150 mg) by mouth once daily in the morning. Do not crush, chew, or split. 30 tablet 5    citalopram (CeleXA) 20 mg tablet TAKE 1 TABLET BY MOUTH EVERY DAY 90 tablet 1    modafinil (Provigil) 100 mg tablet Take 1 tablet (100 mg) by mouth 2 times a day as needed (sleepiness). 60 tablet 0    syringe with needle 3 mL 22 x 1 1/2\" syringe USE EVERY TWO WEEKS TO INJECT TESTOSTERONE. 50 each 1    testosterone cypionate (Depo-Testosterone) 200 mg/mL injection INJECT 1 ML INTRAMUSCULARLY EVERY 2 WEEKS (Patient not taking: Reported on 7/21/2025) 2 mL 1     No current facility-administered medications for this visit.       PAST HISTORIES     PERTINENT PAST MEDICAL HISTORY: See HPI    PERTINENT PAST SURGICAL HISTORY for Sleep Medicine:  non-contributory    PERTINENT FAMILY HISTORY for Sleep Medicine:  sleep apnea on PAP- mother, father, brothers x 2     PERTINENT SOCIAL HISTORY:  He  reports that he has never smoked. He has never been exposed to tobacco smoke. He has never used smokeless tobacco. He reports that he does not currently use alcohol. He reports that he does not use drugs. 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     Active Problems, Allergy List, Medication List, and PMH/PSH/FH/Social Hx have been reviewed and reconciled in chart. No significant changes unless documented in the pertinent chart section. Updates made when necessary.     PHYSICAL EXAM     VITAL SIGNS: There were no vitals taken for this visit.    CURRENT WEIGHT:   There were no vitals filed for this visit.    PREVIOUS WEIGHTS:  Wt Readings from Last 3 Encounters:   04/15/25 114 kg (250 lb 6.4 oz)   01/13/25 115 kg (252 lb 9.6 oz)   11/15/24 114 kg (252 lb 3.2 oz)     Limited telehealth examination  PHYSICAL EXAMINATION  General appearance: awake alert in NAD  Affect: normal  HEENT: Nasal " "congestion absent  Lungs: no cough.  Neuro: normal speech      RESULTS/DATA     No results found for: \"IRON\", \"TRANSFERRIN\", \"IRONSAT\", \"TIBC\", \"FERRITIN\"    CARBON DIOXIDE   Date Value Ref Range Status   04/26/2025 28 20 - 32 mmol/L Final       ASSESSMENT/PLAN     Mr. Christensen is a 50 y.o. male and He was referred to the [facility] for evaluation of MASON and Narcolepsy     Problem List, Orders, Assessment, Recommendations:    #NARCOLEPSY without cataplexy   - MSLT on 7/31/2024 revealed a mean SL of 6.1 min with 2 SOREMPs.   - Continue modafinil 100 mg once daily and may take another dose in the afternoon as needed for breakthrough sleepiness.  Do not take past 3 PM   - Continue Xywav (doing excellent)  - Advised patient to monitor blood pressure once daily and call clinic if BP>160/100 mm Hg.  - OARRS report checked today. No signs of abuse.   - Controlled substance agreement signed today 8/6/24, will update at follow up visit.   - Urine drug screen completed 7/31/24- normal. UDS ordered   - Discussed safety concerns with the patient including: narcolepsy itself is not deadly, but it may be dangerous if episodes occur during driving, operating machinery, or similar activities.   Possible issues and complications with narcolepsy include difficulty with social activities, injuries and accidents if sleep attacks occur during the activity, and side effects of medications used to treat the disorder.  Supportive management:   Plan naps to control daytime sleepiness and reduce the number of unplanned sudden sleep attacks. Schedule a brief nap (10-15 minutes) in strategic times during the day, if possible.   Inform teachers or work supervisors about the condition.   Maintain a regular sleep-wake schedule and practice good sleep hygiene.   Expose self to bright light in daytime.   Exercise daily in daytime as it can beneficial and STIMULATING.   Avoid alcohol and sedative-hypnotic drugs as " these substances may exacerbate sleepiness.   Avoid driving vehicle or operating heavy machinery when sleepy until daytime sleepiness is well-treated. A person driving while sleepy is 5 times more likely to have an accident. If you feel sleepy, pull over and take a short power nap (sleep for less than 30 minutes). Otherwise, ask somebody to drive you.     #MASON, severe  - HSAT 9/28/2023-showing severe MASON with KAMI 3% 65.7 KAMI 4% 52.7, SpO2 gaye 75%.  - PAP titration 10/27/2023-successful titration with CPAP at 15 cm H2O in which sleep disordered breathing, sleep-related hypoxemia, and snoring were resolved.  Optimal mask was ResMed AirFit P30 I nasal pillow size medium.  - Retrieved and personally reviewed recent PAP adherence download data today. See HPI.  - excellent compliance to PAP therapy, residual AHI at goal, and good management of MASON symptoms   - Continue current setting CPAP 17 CWP  - Supply renewal order placed to DME- [facility] (P30i- [facility])   - diet, exercise, and weight loss were emphasized today in clinic, as were non-supine sleep, avoiding alcohol in the late evening, and driving or operating heavy machinery when sleepy. Patient verbalized understanding.     #Obesity  BMI Readings from Last 1 Encounters:   04/15/25 40.42 kg/m²     - Encouraged healthy weight loss via diet and exercise  - Weight loss can help in the long term treatment of MASON.  - Defer management to PCP     All of patient's questions were answered. He verbalizes understanding and agreement with my assessment and plan.    Disposition    Return to clinic in 3 month in person to sign CSA

## 2025-07-23 DIAGNOSIS — G47.419 PRIMARY NARCOLEPSY WITHOUT CATAPLEXY (HHS-HCC): Primary | ICD-10-CM

## 2025-07-23 LAB
AMPHETAMINES UR QL: NEGATIVE NG/ML
BARBITURATES UR QL: NEGATIVE NG/ML
BENZODIAZ UR QL: NEGATIVE NG/ML
BZE UR QL: NEGATIVE NG/ML
CREAT UR-MCNC: 73 MG/DL
FENTANYL UR QL SCN: NEGATIVE NG/ML
METHADONE UR QL: NEGATIVE NG/ML
OPIATES UR QL: NEGATIVE NG/ML
OXIDANTS UR QL: NEGATIVE MCG/ML
OXYCODONE UR QL: NEGATIVE NG/ML
PCP UR QL: NEGATIVE NG/ML
PH UR: 6.1 [PH] (ref 4.5–9)
QUEST NOTES AND COMMENTS: NORMAL
THC UR QL: NEGATIVE NG/ML

## 2025-07-23 RX ORDER — MODAFINIL 100 MG/1
100 TABLET ORAL 2 TIMES DAILY PRN
Qty: 60 TABLET | Refills: 0 | Status: SHIPPED | OUTPATIENT
Start: 2025-07-23 | End: 2025-08-22

## 2025-07-25 ENCOUNTER — TELEPHONE (OUTPATIENT)
Dept: SLEEP MEDICINE | Facility: HOSPITAL | Age: 50
End: 2025-07-25
Payer: COMMERCIAL

## 2025-07-25 DIAGNOSIS — G47.419 PRIMARY NARCOLEPSY WITHOUT CATAPLEXY (HHS-HCC): Primary | ICD-10-CM

## 2025-07-25 NOTE — TELEPHONE ENCOUNTER
Pt requesting refill xywav    This RN checked OARRS and it is consist with prescribed medications. Patient has been filling Rx appropriately. Prescription request sent to provider Pooja Hassan  to review.    Next appt with sleep med provider 10/20/2025    UDS 7/2025    NEEDS CSA AT UPCOMING APPOINTMENT

## 2025-10-16 ENCOUNTER — APPOINTMENT (OUTPATIENT)
Dept: PRIMARY CARE | Facility: CLINIC | Age: 50
End: 2025-10-16
Payer: COMMERCIAL

## 2025-10-22 ENCOUNTER — APPOINTMENT (OUTPATIENT)
Facility: CLINIC | Age: 50
End: 2025-10-22
Payer: COMMERCIAL